# Patient Record
Sex: FEMALE | Race: OTHER | NOT HISPANIC OR LATINO
[De-identification: names, ages, dates, MRNs, and addresses within clinical notes are randomized per-mention and may not be internally consistent; named-entity substitution may affect disease eponyms.]

---

## 2018-02-05 PROBLEM — Z00.00 ENCOUNTER FOR PREVENTIVE HEALTH EXAMINATION: Status: ACTIVE | Noted: 2018-02-05

## 2018-02-13 ENCOUNTER — APPOINTMENT (OUTPATIENT)
Dept: OPHTHALMOLOGY | Facility: CLINIC | Age: 33
End: 2018-02-13

## 2018-04-03 ENCOUNTER — APPOINTMENT (OUTPATIENT)
Dept: OPHTHALMOLOGY | Facility: CLINIC | Age: 33
End: 2018-04-03

## 2018-05-30 ENCOUNTER — APPOINTMENT (OUTPATIENT)
Dept: OPHTHALMOLOGY | Facility: CLINIC | Age: 33
End: 2018-05-30
Payer: MEDICAID

## 2018-05-30 PROCEDURE — 76512 OPH US DX B-SCAN: CPT

## 2018-05-30 PROCEDURE — 92002 INTRM OPH EXAM NEW PATIENT: CPT

## 2018-05-30 RX ORDER — DORZOLAMIDE HYDROCHLORIDE TIMOLOL MALEATE 20; 5 MG/ML; MG/ML
22.3-6.8 SOLUTION/ DROPS OPHTHALMIC
Qty: 1 | Refills: 5 | Status: ACTIVE | COMMUNITY
Start: 2018-05-30 | End: 1900-01-01

## 2018-06-14 ENCOUNTER — APPOINTMENT (OUTPATIENT)
Dept: OPHTHALMOLOGY | Facility: CLINIC | Age: 33
End: 2018-06-14
Payer: MEDICAID

## 2018-06-14 PROCEDURE — 92012 INTRM OPH EXAM EST PATIENT: CPT

## 2018-09-14 ENCOUNTER — APPOINTMENT (OUTPATIENT)
Dept: OPHTHALMOLOGY | Facility: CLINIC | Age: 33
End: 2018-09-14
Payer: MEDICAID

## 2018-09-14 ENCOUNTER — TRANSCRIPTION ENCOUNTER (OUTPATIENT)
Age: 33
End: 2018-09-14

## 2018-09-14 DIAGNOSIS — H17.9 UNSPECIFIED CORNEAL SCAR AND OPACITY: ICD-10-CM

## 2018-09-14 DIAGNOSIS — H40.2233 CHRONIC ANGLE-CLOSURE GLAUCOMA, BILATERAL, SEVERE STAGE: ICD-10-CM

## 2018-09-14 DIAGNOSIS — Q13.1 ABSENCE OF IRIS: ICD-10-CM

## 2018-09-14 DIAGNOSIS — H55.00 UNSPECIFIED NYSTAGMUS: ICD-10-CM

## 2018-09-14 PROCEDURE — 92012 INTRM OPH EXAM EST PATIENT: CPT

## 2018-09-16 ENCOUNTER — INPATIENT (INPATIENT)
Facility: HOSPITAL | Age: 33
LOS: 4 days | Discharge: HOME HEALTH | End: 2018-09-21
Attending: OBSTETRICS & GYNECOLOGY | Admitting: OBSTETRICS & GYNECOLOGY
Payer: COMMERCIAL

## 2018-09-16 ENCOUNTER — RESULT REVIEW (OUTPATIENT)
Age: 33
End: 2018-09-16

## 2018-09-16 VITALS
HEART RATE: 98 BPM | RESPIRATION RATE: 20 BRPM | TEMPERATURE: 98 F | SYSTOLIC BLOOD PRESSURE: 193 MMHG | OXYGEN SATURATION: 99 % | DIASTOLIC BLOOD PRESSURE: 112 MMHG

## 2018-09-16 DIAGNOSIS — N17.9 ACUTE KIDNEY FAILURE, UNSPECIFIED: ICD-10-CM

## 2018-09-16 DIAGNOSIS — O99.89 OTHER SPECIFIED DISEASES AND CONDITIONS COMPLICATING PREGNANCY, CHILDBIRTH AND THE PUERPERIUM: ICD-10-CM

## 2018-09-16 DIAGNOSIS — I27.0 PRIMARY PULMONARY HYPERTENSION: ICD-10-CM

## 2018-09-16 DIAGNOSIS — Z79.4 LONG TERM (CURRENT) USE OF INSULIN: ICD-10-CM

## 2018-09-16 DIAGNOSIS — Z91.14 PATIENT'S OTHER NONCOMPLIANCE WITH MEDICATION REGIMEN: ICD-10-CM

## 2018-09-16 DIAGNOSIS — Z3A.34 34 WEEKS GESTATION OF PREGNANCY: ICD-10-CM

## 2018-09-16 DIAGNOSIS — Q13.1 ABSENCE OF IRIS: ICD-10-CM

## 2018-09-16 DIAGNOSIS — H40.9 UNSPECIFIED GLAUCOMA: ICD-10-CM

## 2018-09-16 DIAGNOSIS — N73.6 FEMALE PELVIC PERITONEAL ADHESIONS (POSTINFECTIVE): ICD-10-CM

## 2018-09-16 DIAGNOSIS — K56.7 ILEUS, UNSPECIFIED: ICD-10-CM

## 2018-09-16 DIAGNOSIS — E66.9 OBESITY, UNSPECIFIED: ICD-10-CM

## 2018-09-16 DIAGNOSIS — H54.8 LEGAL BLINDNESS, AS DEFINED IN USA: ICD-10-CM

## 2018-09-16 DIAGNOSIS — O60.03 PRETERM LABOR WITHOUT DELIVERY, THIRD TRIMESTER: ICD-10-CM

## 2018-09-16 DIAGNOSIS — O30.033 TWIN PREGNANCY, MONOCHORIONIC/DIAMNIOTIC, THIRD TRIMESTER: ICD-10-CM

## 2018-09-16 DIAGNOSIS — E87.70 FLUID OVERLOAD, UNSPECIFIED: ICD-10-CM

## 2018-09-16 LAB
ALBUMIN SERPL ELPH-MCNC: 2.5 G/DL — LOW (ref 3.3–5)
ALBUMIN SERPL ELPH-MCNC: 2.9 G/DL — LOW (ref 3.3–5)
ALP SERPL-CCNC: 81 U/L — SIGNIFICANT CHANGE UP (ref 40–120)
ALP SERPL-CCNC: 87 U/L — SIGNIFICANT CHANGE UP (ref 40–120)
ALT FLD-CCNC: 8 U/L — LOW (ref 10–45)
ALT FLD-CCNC: 8 U/L — LOW (ref 10–45)
ANION GAP SERPL CALC-SCNC: 16 MMOL/L — SIGNIFICANT CHANGE UP (ref 5–17)
ANION GAP SERPL CALC-SCNC: 20 MMOL/L — HIGH (ref 5–17)
APPEARANCE UR: CLEAR — SIGNIFICANT CHANGE UP
APTT BLD: 25.7 SEC — LOW (ref 27.5–37.4)
APTT BLD: 26.1 SEC — LOW (ref 27.5–37.4)
AST SERPL-CCNC: 17 U/L — SIGNIFICANT CHANGE UP (ref 10–40)
AST SERPL-CCNC: 23 U/L — SIGNIFICANT CHANGE UP (ref 10–40)
BASOPHILS NFR BLD AUTO: 0.1 % — SIGNIFICANT CHANGE UP (ref 0–2)
BASOPHILS NFR BLD AUTO: 0.3 % — SIGNIFICANT CHANGE UP (ref 0–2)
BILIRUB SERPL-MCNC: 0.3 MG/DL — SIGNIFICANT CHANGE UP (ref 0.2–1.2)
BILIRUB SERPL-MCNC: 0.4 MG/DL — SIGNIFICANT CHANGE UP (ref 0.2–1.2)
BILIRUB UR-MCNC: NEGATIVE — SIGNIFICANT CHANGE UP
BUN SERPL-MCNC: 15 MG/DL — SIGNIFICANT CHANGE UP (ref 7–23)
BUN SERPL-MCNC: 15 MG/DL — SIGNIFICANT CHANGE UP (ref 7–23)
CALCIUM SERPL-MCNC: 8.7 MG/DL — SIGNIFICANT CHANGE UP (ref 8.4–10.5)
CALCIUM SERPL-MCNC: 9.2 MG/DL — SIGNIFICANT CHANGE UP (ref 8.4–10.5)
CHLORIDE SERPL-SCNC: 99 MMOL/L — SIGNIFICANT CHANGE UP (ref 96–108)
CHLORIDE SERPL-SCNC: 99 MMOL/L — SIGNIFICANT CHANGE UP (ref 96–108)
CO2 SERPL-SCNC: 20 MMOL/L — LOW (ref 22–31)
CO2 SERPL-SCNC: 20 MMOL/L — LOW (ref 22–31)
COLOR SPEC: YELLOW — SIGNIFICANT CHANGE UP
CREAT ?TM UR-MCNC: 74 MG/DL — SIGNIFICANT CHANGE UP
CREAT SERPL-MCNC: 1.03 MG/DL — SIGNIFICANT CHANGE UP (ref 0.5–1.3)
CREAT SERPL-MCNC: 1.04 MG/DL — SIGNIFICANT CHANGE UP (ref 0.5–1.3)
DIFF PNL FLD: ABNORMAL
EOSINOPHIL NFR BLD AUTO: 0.1 % — SIGNIFICANT CHANGE UP (ref 0–6)
EOSINOPHIL NFR BLD AUTO: 0.8 % — SIGNIFICANT CHANGE UP (ref 0–6)
FIBRINOGEN PPP-MCNC: 440 MG/DL — HIGH (ref 258–438)
FIBRINOGEN PPP-MCNC: 484 MG/DL — HIGH (ref 258–438)
GLUCOSE BLDC GLUCOMTR-MCNC: 157 MG/DL — HIGH (ref 70–99)
GLUCOSE SERPL-MCNC: 118 MG/DL — HIGH (ref 70–99)
GLUCOSE SERPL-MCNC: 177 MG/DL — HIGH (ref 70–99)
GLUCOSE UR QL: NEGATIVE — SIGNIFICANT CHANGE UP
HCT VFR BLD CALC: 31.9 % — LOW (ref 34.5–45)
HCT VFR BLD CALC: 34.4 % — LOW (ref 34.5–45)
HGB BLD-MCNC: 10.1 G/DL — LOW (ref 11.5–15.5)
HGB BLD-MCNC: 10.8 G/DL — LOW (ref 11.5–15.5)
INR BLD: 0.89 — SIGNIFICANT CHANGE UP (ref 0.88–1.16)
INR BLD: 0.95 — SIGNIFICANT CHANGE UP (ref 0.88–1.16)
KETONES UR-MCNC: 15 MG/DL
LDH SERPL L TO P-CCNC: 270 U/L — HIGH (ref 50–242)
LDH SERPL L TO P-CCNC: 464 U/L — HIGH (ref 50–242)
LEUKOCYTE ESTERASE UR-ACNC: NEGATIVE — SIGNIFICANT CHANGE UP
LYMPHOCYTES # BLD AUTO: 16.9 % — SIGNIFICANT CHANGE UP (ref 13–44)
LYMPHOCYTES # BLD AUTO: 6.8 % — LOW (ref 13–44)
MAGNESIUM SERPL-MCNC: 5 MG/DL — HIGH (ref 1.6–2.6)
MCHC RBC-ENTMCNC: 27.2 PG — SIGNIFICANT CHANGE UP (ref 27–34)
MCHC RBC-ENTMCNC: 27.5 PG — SIGNIFICANT CHANGE UP (ref 27–34)
MCHC RBC-ENTMCNC: 31.4 G/DL — LOW (ref 32–36)
MCHC RBC-ENTMCNC: 31.7 G/DL — LOW (ref 32–36)
MCV RBC AUTO: 86.6 FL — SIGNIFICANT CHANGE UP (ref 80–100)
MCV RBC AUTO: 86.9 FL — SIGNIFICANT CHANGE UP (ref 80–100)
MONOCYTES NFR BLD AUTO: 10.1 % — SIGNIFICANT CHANGE UP (ref 2–14)
MONOCYTES NFR BLD AUTO: 11.3 % — SIGNIFICANT CHANGE UP (ref 2–14)
NEUTROPHILS NFR BLD AUTO: 71.9 % — SIGNIFICANT CHANGE UP (ref 43–77)
NEUTROPHILS NFR BLD AUTO: 81.7 % — HIGH (ref 43–77)
NITRITE UR-MCNC: NEGATIVE — SIGNIFICANT CHANGE UP
PH UR: 5.5 — SIGNIFICANT CHANGE UP (ref 5–8)
PLATELET # BLD AUTO: 187 K/UL — SIGNIFICANT CHANGE UP (ref 150–400)
PLATELET # BLD AUTO: 212 K/UL — SIGNIFICANT CHANGE UP (ref 150–400)
POTASSIUM SERPL-MCNC: 4.2 MMOL/L — SIGNIFICANT CHANGE UP (ref 3.5–5.3)
POTASSIUM SERPL-MCNC: 4.3 MMOL/L — SIGNIFICANT CHANGE UP (ref 3.5–5.3)
POTASSIUM SERPL-SCNC: 4.2 MMOL/L — SIGNIFICANT CHANGE UP (ref 3.5–5.3)
POTASSIUM SERPL-SCNC: 4.3 MMOL/L — SIGNIFICANT CHANGE UP (ref 3.5–5.3)
PROT ?TM UR-MCNC: 171 MG/DL — HIGH (ref 0–12)
PROT SERPL-MCNC: 5.5 G/DL — LOW (ref 6–8.3)
PROT SERPL-MCNC: 6 G/DL — SIGNIFICANT CHANGE UP (ref 6–8.3)
PROT UR-MCNC: 100 MG/DL
PROT/CREAT UR-RTO: 2.3 RATIO — HIGH (ref 0–0.2)
PROTHROM AB SERPL-ACNC: 10.5 SEC — SIGNIFICANT CHANGE UP (ref 9.8–12.7)
PROTHROM AB SERPL-ACNC: 9.9 SEC — SIGNIFICANT CHANGE UP (ref 9.8–12.7)
RBC # BLD: 3.67 M/UL — LOW (ref 3.8–5.2)
RBC # BLD: 3.97 M/UL — SIGNIFICANT CHANGE UP (ref 3.8–5.2)
RBC # FLD: 13.6 % — SIGNIFICANT CHANGE UP (ref 10.3–16.9)
RBC # FLD: 13.6 % — SIGNIFICANT CHANGE UP (ref 10.3–16.9)
SODIUM SERPL-SCNC: 135 MMOL/L — SIGNIFICANT CHANGE UP (ref 135–145)
SODIUM SERPL-SCNC: 139 MMOL/L — SIGNIFICANT CHANGE UP (ref 135–145)
SP GR SPEC: >=1.03 — SIGNIFICANT CHANGE UP (ref 1–1.03)
URATE SERPL-MCNC: 7.7 MG/DL — HIGH (ref 2.5–7)
URATE SERPL-MCNC: 7.7 MG/DL — HIGH (ref 2.5–7)
UROBILINOGEN FLD QL: 0.2 E.U./DL — SIGNIFICANT CHANGE UP
WBC # BLD: 11.8 K/UL — HIGH (ref 3.8–10.5)
WBC # BLD: 14.1 K/UL — HIGH (ref 3.8–10.5)
WBC # FLD AUTO: 11.8 K/UL — HIGH (ref 3.8–10.5)
WBC # FLD AUTO: 14.1 K/UL — HIGH (ref 3.8–10.5)

## 2018-09-16 RX ORDER — SODIUM CHLORIDE 9 MG/ML
1000 INJECTION, SOLUTION INTRAVENOUS
Qty: 0 | Refills: 0 | Status: DISCONTINUED | OUTPATIENT
Start: 2018-09-16 | End: 2018-09-18

## 2018-09-16 RX ORDER — GLYCERIN ADULT
1 SUPPOSITORY, RECTAL RECTAL AT BEDTIME
Qty: 0 | Refills: 0 | Status: DISCONTINUED | OUTPATIENT
Start: 2018-09-16 | End: 2018-09-17

## 2018-09-16 RX ORDER — IBUPROFEN 200 MG
600 TABLET ORAL ONCE
Qty: 0 | Refills: 0 | Status: COMPLETED | OUTPATIENT
Start: 2018-09-16 | End: 2018-09-16

## 2018-09-16 RX ORDER — CITRIC ACID/SODIUM CITRATE 300-500 MG
30 SOLUTION, ORAL ORAL ONCE
Qty: 0 | Refills: 0 | Status: DISCONTINUED | OUTPATIENT
Start: 2018-09-16 | End: 2018-09-17

## 2018-09-16 RX ORDER — LABETALOL HCL 100 MG
200 TABLET ORAL
Qty: 0 | Refills: 0 | Status: DISCONTINUED | OUTPATIENT
Start: 2018-09-16 | End: 2018-09-17

## 2018-09-16 RX ORDER — MAGNESIUM SULFATE 500 MG/ML
2 VIAL (ML) INJECTION
Qty: 40 | Refills: 0 | Status: DISCONTINUED | OUTPATIENT
Start: 2018-09-16 | End: 2018-09-17

## 2018-09-16 RX ORDER — OXYTOCIN 10 UNIT/ML
333.33 VIAL (ML) INJECTION
Qty: 20 | Refills: 0 | Status: DISCONTINUED | OUTPATIENT
Start: 2018-09-16 | End: 2018-09-16

## 2018-09-16 RX ORDER — PENICILLIN G POTASSIUM 5000000 [IU]/1
2.5 POWDER, FOR SOLUTION INTRAMUSCULAR; INTRAPLEURAL; INTRATHECAL; INTRAVENOUS EVERY 4 HOURS
Qty: 0 | Refills: 0 | Status: DISCONTINUED | OUTPATIENT
Start: 2018-09-16 | End: 2018-09-16

## 2018-09-16 RX ORDER — NALOXONE HYDROCHLORIDE 4 MG/.1ML
0.1 SPRAY NASAL
Qty: 0 | Refills: 0 | Status: DISCONTINUED | OUTPATIENT
Start: 2018-09-16 | End: 2018-09-18

## 2018-09-16 RX ORDER — TETANUS TOXOID, REDUCED DIPHTHERIA TOXOID AND ACELLULAR PERTUSSIS VACCINE, ADSORBED 5; 2.5; 8; 8; 2.5 [IU]/.5ML; [IU]/.5ML; UG/.5ML; UG/.5ML; UG/.5ML
0.5 SUSPENSION INTRAMUSCULAR ONCE
Qty: 0 | Refills: 0 | Status: DISCONTINUED | OUTPATIENT
Start: 2018-09-16 | End: 2018-09-21

## 2018-09-16 RX ORDER — FERROUS SULFATE 325(65) MG
325 TABLET ORAL DAILY
Qty: 0 | Refills: 0 | Status: DISCONTINUED | OUTPATIENT
Start: 2018-09-16 | End: 2018-09-18

## 2018-09-16 RX ORDER — ACETAMINOPHEN 500 MG
1000 TABLET ORAL ONCE
Qty: 0 | Refills: 0 | Status: DISCONTINUED | OUTPATIENT
Start: 2018-09-16 | End: 2018-09-17

## 2018-09-16 RX ORDER — LABETALOL HCL 100 MG
80 TABLET ORAL ONCE
Qty: 0 | Refills: 0 | Status: COMPLETED | OUTPATIENT
Start: 2018-09-16 | End: 2018-09-16

## 2018-09-16 RX ORDER — CITRIC ACID/SODIUM CITRATE 300-500 MG
15 SOLUTION, ORAL ORAL EVERY 4 HOURS
Qty: 0 | Refills: 0 | Status: DISCONTINUED | OUTPATIENT
Start: 2018-09-16 | End: 2018-09-16

## 2018-09-16 RX ORDER — SIMETHICONE 80 MG/1
80 TABLET, CHEWABLE ORAL EVERY 4 HOURS
Qty: 0 | Refills: 0 | Status: DISCONTINUED | OUTPATIENT
Start: 2018-09-16 | End: 2018-09-21

## 2018-09-16 RX ORDER — FUROSEMIDE 40 MG
20 TABLET ORAL ONCE
Qty: 0 | Refills: 0 | Status: COMPLETED | OUTPATIENT
Start: 2018-09-16 | End: 2018-09-16

## 2018-09-16 RX ORDER — HYDROMORPHONE HYDROCHLORIDE 2 MG/ML
0.5 INJECTION INTRAMUSCULAR; INTRAVENOUS; SUBCUTANEOUS
Qty: 0 | Refills: 0 | Status: DISCONTINUED | OUTPATIENT
Start: 2018-09-16 | End: 2018-09-16

## 2018-09-16 RX ORDER — DEXTROSE 50 % IN WATER 50 %
25 SYRINGE (ML) INTRAVENOUS ONCE
Qty: 0 | Refills: 0 | Status: DISCONTINUED | OUTPATIENT
Start: 2018-09-16 | End: 2018-09-17

## 2018-09-16 RX ORDER — HYDRALAZINE HCL 50 MG
10 TABLET ORAL ONCE
Qty: 0 | Refills: 0 | Status: COMPLETED | OUTPATIENT
Start: 2018-09-16 | End: 2018-09-16

## 2018-09-16 RX ORDER — PENICILLIN G POTASSIUM 5000000 [IU]/1
5 POWDER, FOR SOLUTION INTRAMUSCULAR; INTRAPLEURAL; INTRATHECAL; INTRAVENOUS ONCE
Qty: 0 | Refills: 0 | Status: DISCONTINUED | OUTPATIENT
Start: 2018-09-16 | End: 2018-09-16

## 2018-09-16 RX ORDER — OXYCODONE AND ACETAMINOPHEN 5; 325 MG/1; MG/1
2 TABLET ORAL EVERY 6 HOURS
Qty: 0 | Refills: 0 | Status: DISCONTINUED | OUTPATIENT
Start: 2018-09-16 | End: 2018-09-18

## 2018-09-16 RX ORDER — LANOLIN
1 OINTMENT (GRAM) TOPICAL
Qty: 0 | Refills: 0 | Status: DISCONTINUED | OUTPATIENT
Start: 2018-09-16 | End: 2018-09-21

## 2018-09-16 RX ORDER — DEXTROSE 50 % IN WATER 50 %
12.5 SYRINGE (ML) INTRAVENOUS ONCE
Qty: 0 | Refills: 0 | Status: DISCONTINUED | OUTPATIENT
Start: 2018-09-16 | End: 2018-09-17

## 2018-09-16 RX ORDER — HYDROMORPHONE HYDROCHLORIDE 2 MG/ML
30 INJECTION INTRAMUSCULAR; INTRAVENOUS; SUBCUTANEOUS
Qty: 0 | Refills: 0 | Status: DISCONTINUED | OUTPATIENT
Start: 2018-09-16 | End: 2018-09-17

## 2018-09-16 RX ORDER — AZITHROMYCIN 500 MG/1
500 TABLET, FILM COATED ORAL ONCE
Qty: 0 | Refills: 0 | Status: COMPLETED | OUTPATIENT
Start: 2018-09-16 | End: 2018-09-16

## 2018-09-16 RX ORDER — DEXTROSE 50 % IN WATER 50 %
15 SYRINGE (ML) INTRAVENOUS ONCE
Qty: 0 | Refills: 0 | Status: DISCONTINUED | OUTPATIENT
Start: 2018-09-16 | End: 2018-09-17

## 2018-09-16 RX ORDER — SODIUM CHLORIDE 9 MG/ML
1000 INJECTION, SOLUTION INTRAVENOUS ONCE
Qty: 0 | Refills: 0 | Status: DISCONTINUED | OUTPATIENT
Start: 2018-09-16 | End: 2018-09-16

## 2018-09-16 RX ORDER — ONDANSETRON 8 MG/1
4 TABLET, FILM COATED ORAL EVERY 6 HOURS
Qty: 0 | Refills: 0 | Status: DISCONTINUED | OUTPATIENT
Start: 2018-09-16 | End: 2018-09-21

## 2018-09-16 RX ORDER — DOCUSATE SODIUM 100 MG
100 CAPSULE ORAL
Qty: 0 | Refills: 0 | Status: DISCONTINUED | OUTPATIENT
Start: 2018-09-16 | End: 2018-09-18

## 2018-09-16 RX ORDER — GLUCAGON INJECTION, SOLUTION 0.5 MG/.1ML
1 INJECTION, SOLUTION SUBCUTANEOUS ONCE
Qty: 0 | Refills: 0 | Status: DISCONTINUED | OUTPATIENT
Start: 2018-09-16 | End: 2018-09-17

## 2018-09-16 RX ORDER — SODIUM CHLORIDE 9 MG/ML
1000 INJECTION, SOLUTION INTRAVENOUS
Qty: 0 | Refills: 0 | Status: DISCONTINUED | OUTPATIENT
Start: 2018-09-16 | End: 2018-09-17

## 2018-09-16 RX ORDER — INSULIN LISPRO 100/ML
VIAL (ML) SUBCUTANEOUS
Qty: 0 | Refills: 0 | Status: DISCONTINUED | OUTPATIENT
Start: 2018-09-16 | End: 2018-09-21

## 2018-09-16 RX ORDER — SODIUM CHLORIDE 9 MG/ML
1000 INJECTION, SOLUTION INTRAVENOUS
Qty: 0 | Refills: 0 | Status: DISCONTINUED | OUTPATIENT
Start: 2018-09-16 | End: 2018-09-16

## 2018-09-16 RX ORDER — PENICILLIN G POTASSIUM 5000000 [IU]/1
POWDER, FOR SOLUTION INTRAMUSCULAR; INTRAPLEURAL; INTRATHECAL; INTRAVENOUS
Qty: 0 | Refills: 0 | Status: DISCONTINUED | OUTPATIENT
Start: 2018-09-16 | End: 2018-09-16

## 2018-09-16 RX ORDER — DIPHENHYDRAMINE HCL 50 MG
25 CAPSULE ORAL EVERY 6 HOURS
Qty: 0 | Refills: 0 | Status: DISCONTINUED | OUTPATIENT
Start: 2018-09-16 | End: 2018-09-17

## 2018-09-16 RX ORDER — CEFAZOLIN SODIUM 1 G
2000 VIAL (EA) INJECTION ONCE
Qty: 0 | Refills: 0 | Status: DISCONTINUED | OUTPATIENT
Start: 2018-09-16 | End: 2018-09-16

## 2018-09-16 RX ORDER — OXYCODONE AND ACETAMINOPHEN 5; 325 MG/1; MG/1
1 TABLET ORAL
Qty: 0 | Refills: 0 | Status: DISCONTINUED | OUTPATIENT
Start: 2018-09-16 | End: 2018-09-18

## 2018-09-16 RX ORDER — MAGNESIUM SULFATE 500 MG/ML
4 VIAL (ML) INJECTION ONCE
Qty: 0 | Refills: 0 | Status: COMPLETED | OUTPATIENT
Start: 2018-09-16 | End: 2018-09-16

## 2018-09-16 RX ORDER — OXYTOCIN 10 UNIT/ML
41.67 VIAL (ML) INJECTION
Qty: 20 | Refills: 0 | Status: DISCONTINUED | OUTPATIENT
Start: 2018-09-16 | End: 2018-09-18

## 2018-09-16 RX ADMIN — HYDROMORPHONE HYDROCHLORIDE 0.5 MILLIGRAM(S): 2 INJECTION INTRAMUSCULAR; INTRAVENOUS; SUBCUTANEOUS at 14:10

## 2018-09-16 RX ADMIN — Medication 200 MILLIGRAM(S): at 19:27

## 2018-09-16 RX ADMIN — Medication 600 MILLIGRAM(S): at 20:51

## 2018-09-16 RX ADMIN — Medication 125 MILLIUNIT(S)/MIN: at 16:15

## 2018-09-16 RX ADMIN — Medication 80 MILLIGRAM(S): at 21:25

## 2018-09-16 RX ADMIN — OXYCODONE AND ACETAMINOPHEN 1 TABLET(S): 5; 325 TABLET ORAL at 21:06

## 2018-09-16 RX ADMIN — Medication 600 MILLIGRAM(S): at 21:50

## 2018-09-16 RX ADMIN — HYDROMORPHONE HYDROCHLORIDE 0.5 MILLIGRAM(S): 2 INJECTION INTRAMUSCULAR; INTRAVENOUS; SUBCUTANEOUS at 13:53

## 2018-09-16 RX ADMIN — HYDROMORPHONE HYDROCHLORIDE 0.5 MILLIGRAM(S): 2 INJECTION INTRAMUSCULAR; INTRAVENOUS; SUBCUTANEOUS at 15:23

## 2018-09-16 RX ADMIN — Medication 300 GRAM(S): at 10:19

## 2018-09-16 RX ADMIN — Medication 10 MILLIGRAM(S): at 15:15

## 2018-09-16 RX ADMIN — OXYCODONE AND ACETAMINOPHEN 1 TABLET(S): 5; 325 TABLET ORAL at 22:06

## 2018-09-16 RX ADMIN — SODIUM CHLORIDE 125 MILLILITER(S): 9 INJECTION, SOLUTION INTRAVENOUS at 10:23

## 2018-09-16 RX ADMIN — Medication 2: at 19:24

## 2018-09-16 RX ADMIN — Medication 50 GM/HR: at 10:40

## 2018-09-16 RX ADMIN — HYDROMORPHONE HYDROCHLORIDE 30 MILLILITER(S): 2 INJECTION INTRAMUSCULAR; INTRAVENOUS; SUBCUTANEOUS at 23:00

## 2018-09-16 RX ADMIN — Medication 20 MILLIGRAM(S): at 22:02

## 2018-09-16 RX ADMIN — AZITHROMYCIN 255 MILLIGRAM(S): 500 TABLET, FILM COATED ORAL at 11:50

## 2018-09-16 NOTE — PROGRESS NOTE ADULT - ASSESSMENT
A&P: 33y Female   s/p CS @34wks for PTL/pPROM of mono-di twins complicated by preeclampsia with severe features  - Preeclampsia: Continue IV Magnesium @2G/hr for 24 hrs post delivery. Magnesium clinical checks and serum assay q6 hrs. No complaints currently.   - VTE: SCDs, SQH  - GI: Diet: clears as tolerated if not nauseous  - Neuro: Oral pain medications as needed: hold NSAIDs  - : strict Is and Os, D/C estrella after discontinuation of IV Magnesium  - Discharge planning: discussed importance of BP check at home, will be evaluated by PP nursing re need for VNS. Discussed close follow up with OB within 1-2 wks. Reviewed toxic complaints with patient.

## 2018-09-16 NOTE — ED PROVIDER NOTE - OBJECTIVE STATEMENT
The patient has the chief complaint of abdominal pain due to active labor. The patient has no other acute problems. The patient is alert, oriented and in appropriate distress due to the active labor. This evaluation determines that the patient is cleared to be admitted and transferred to labor and delivery for continuation of evaluation and management.

## 2018-09-16 NOTE — PROGRESS NOTE ADULT - SUBJECTIVE AND OBJECTIVE BOX
S: Pt evaluated at bedside for magnesium check. Received Hydralazine 10mg IVP @1515 for severe range BPs in PACU, with appropriate response.  She denies visual disturbances, headache and right upper quadrant pain. Also denies nausea/vomiting/epigastric pain/shortness of breath. Pain well controlled.     O:  T(C): 36.9 (18 @ 09:29), Max: 36.9 (18 @ 09:29)  HR: 98 (18 @ 16:36) (78 - 98)  BP: 143/76 (18 @ 16:36) (128/69 - 193/112)  RR: 12 (18 @ 16:36) (12 - 28)  SpO2: 95% (18 @ 16:36) (94% - 99%)  Wt(kg): --  Daily Height in cm: 154.94 (16 Sep 2018 13:36)    Daily Weight Pre-pregnancy in k (16 Sep 2018 13:36)     @ 07:01  -   @ 17:07  --------------------------------------------------------  IN: 1800 mL / OUT: 1880 mL / NET: -80 mL      Gen: NAD, AAOx3  CV: RRR, no M/R/G  Pulm: CTAB, no R/R/W  Abd: soft, nontender, no rebound or guarding, no epigastric tenderness, liver nonpalpable +BS. Prevena in place.  : Bliss   Ext: +3 edema yeimi                          10.8   11.8  )-----------( 212      ( 16 Sep 2018 10:24 )             34.4         139  |  99  |  15  ----------------------------<  118<H>  4.2   |  20<L>  |  1.03    Ca    9.2      16 Sep 2018 10:24    TPro  6.0  /  Alb  2.9<L>  /  TBili  0.4  /  DBili  x   /  AST  17  /  ALT  8<L>  /  AlkPhos  87  09-16

## 2018-09-16 NOTE — ED PROVIDER NOTE - NOTES
aware bp is elevated.  pt denies hx of this.  will recheck in L and D and start Mag if still elevated.

## 2018-09-17 DIAGNOSIS — O14.90 UNSPECIFIED PRE-ECLAMPSIA, UNSPECIFIED TRIMESTER: ICD-10-CM

## 2018-09-17 DIAGNOSIS — E83.41 HYPERMAGNESEMIA: ICD-10-CM

## 2018-09-17 LAB
ALBUMIN SERPL ELPH-MCNC: 2.1 G/DL — LOW (ref 3.3–5)
ALBUMIN SERPL ELPH-MCNC: 2.4 G/DL — LOW (ref 3.3–5)
ALP SERPL-CCNC: 69 U/L — SIGNIFICANT CHANGE UP (ref 40–120)
ALP SERPL-CCNC: 78 U/L — SIGNIFICANT CHANGE UP (ref 40–120)
ALT FLD-CCNC: 5 U/L — LOW (ref 10–45)
ALT FLD-CCNC: 8 U/L — LOW (ref 10–45)
ANION GAP SERPL CALC-SCNC: 12 MMOL/L — SIGNIFICANT CHANGE UP (ref 5–17)
ANION GAP SERPL CALC-SCNC: 12 MMOL/L — SIGNIFICANT CHANGE UP (ref 5–17)
APTT BLD: 27.5 SEC — SIGNIFICANT CHANGE UP (ref 27.5–37.4)
AST SERPL-CCNC: 18 U/L — SIGNIFICANT CHANGE UP (ref 10–40)
AST SERPL-CCNC: 19 U/L — SIGNIFICANT CHANGE UP (ref 10–40)
BASOPHILS NFR BLD AUTO: 0.1 % — SIGNIFICANT CHANGE UP (ref 0–2)
BASOPHILS NFR BLD AUTO: 0.1 % — SIGNIFICANT CHANGE UP (ref 0–2)
BILIRUB SERPL-MCNC: 0.3 MG/DL — SIGNIFICANT CHANGE UP (ref 0.2–1.2)
BILIRUB SERPL-MCNC: 0.3 MG/DL — SIGNIFICANT CHANGE UP (ref 0.2–1.2)
BUN SERPL-MCNC: 13 MG/DL — SIGNIFICANT CHANGE UP (ref 7–23)
BUN SERPL-MCNC: 14 MG/DL — SIGNIFICANT CHANGE UP (ref 7–23)
CALCIUM SERPL-MCNC: 8.1 MG/DL — LOW (ref 8.4–10.5)
CALCIUM SERPL-MCNC: 8.4 MG/DL — SIGNIFICANT CHANGE UP (ref 8.4–10.5)
CHLORIDE SERPL-SCNC: 100 MMOL/L — SIGNIFICANT CHANGE UP (ref 96–108)
CHLORIDE SERPL-SCNC: 94 MMOL/L — LOW (ref 96–108)
CO2 SERPL-SCNC: 23 MMOL/L — SIGNIFICANT CHANGE UP (ref 22–31)
CO2 SERPL-SCNC: 23 MMOL/L — SIGNIFICANT CHANGE UP (ref 22–31)
CREAT SERPL-MCNC: 1.11 MG/DL — SIGNIFICANT CHANGE UP (ref 0.5–1.3)
CREAT SERPL-MCNC: 1.15 MG/DL — SIGNIFICANT CHANGE UP (ref 0.5–1.3)
EOSINOPHIL NFR BLD AUTO: 0.2 % — SIGNIFICANT CHANGE UP (ref 0–6)
EOSINOPHIL NFR BLD AUTO: 0.2 % — SIGNIFICANT CHANGE UP (ref 0–6)
FIBRINOGEN PPP-MCNC: 490 MG/DL — HIGH (ref 258–438)
GLUCOSE BLDC GLUCOMTR-MCNC: 141 MG/DL — HIGH (ref 70–99)
GLUCOSE BLDC GLUCOMTR-MCNC: 146 MG/DL — HIGH (ref 70–99)
GLUCOSE BLDC GLUCOMTR-MCNC: 171 MG/DL — HIGH (ref 70–99)
GLUCOSE BLDC GLUCOMTR-MCNC: 182 MG/DL — HIGH (ref 70–99)
GLUCOSE BLDC GLUCOMTR-MCNC: 188 MG/DL — HIGH (ref 70–99)
GLUCOSE SERPL-MCNC: 135 MG/DL — HIGH (ref 70–99)
GLUCOSE SERPL-MCNC: 177 MG/DL — HIGH (ref 70–99)
HCT VFR BLD CALC: 27.2 % — LOW (ref 34.5–45)
HCT VFR BLD CALC: 30.5 % — LOW (ref 34.5–45)
HGB BLD-MCNC: 8.7 G/DL — LOW (ref 11.5–15.5)
HGB BLD-MCNC: 9.4 G/DL — LOW (ref 11.5–15.5)
INR BLD: 0.93 — SIGNIFICANT CHANGE UP (ref 0.88–1.16)
LDH SERPL L TO P-CCNC: 460 U/L — HIGH (ref 50–242)
LYMPHOCYTES # BLD AUTO: 7.9 % — LOW (ref 13–44)
LYMPHOCYTES # BLD AUTO: 9.5 % — LOW (ref 13–44)
MAGNESIUM SERPL-MCNC: 6.2 MG/DL — HIGH (ref 1.6–2.6)
MAGNESIUM SERPL-MCNC: 7.3 MG/DL — CRITICAL HIGH (ref 1.6–2.6)
MCHC RBC-ENTMCNC: 26.6 PG — LOW (ref 27–34)
MCHC RBC-ENTMCNC: 27.4 PG — SIGNIFICANT CHANGE UP (ref 27–34)
MCHC RBC-ENTMCNC: 30.8 G/DL — LOW (ref 32–36)
MCHC RBC-ENTMCNC: 32 G/DL — SIGNIFICANT CHANGE UP (ref 32–36)
MCV RBC AUTO: 85.5 FL — SIGNIFICANT CHANGE UP (ref 80–100)
MCV RBC AUTO: 86.4 FL — SIGNIFICANT CHANGE UP (ref 80–100)
MONOCYTES NFR BLD AUTO: 6.5 % — SIGNIFICANT CHANGE UP (ref 2–14)
MONOCYTES NFR BLD AUTO: 9.7 % — SIGNIFICANT CHANGE UP (ref 2–14)
NEUTROPHILS NFR BLD AUTO: 80.5 % — HIGH (ref 43–77)
NEUTROPHILS NFR BLD AUTO: 85.3 % — HIGH (ref 43–77)
PLATELET # BLD AUTO: 191 K/UL — SIGNIFICANT CHANGE UP (ref 150–400)
PLATELET # BLD AUTO: 219 K/UL — SIGNIFICANT CHANGE UP (ref 150–400)
POTASSIUM SERPL-MCNC: 4.3 MMOL/L — SIGNIFICANT CHANGE UP (ref 3.5–5.3)
POTASSIUM SERPL-MCNC: 4.6 MMOL/L — SIGNIFICANT CHANGE UP (ref 3.5–5.3)
POTASSIUM SERPL-SCNC: 4.3 MMOL/L — SIGNIFICANT CHANGE UP (ref 3.5–5.3)
POTASSIUM SERPL-SCNC: 4.6 MMOL/L — SIGNIFICANT CHANGE UP (ref 3.5–5.3)
PROT SERPL-MCNC: 4.9 G/DL — LOW (ref 6–8.3)
PROT SERPL-MCNC: 5.4 G/DL — LOW (ref 6–8.3)
PROTHROM AB SERPL-ACNC: 10.3 SEC — SIGNIFICANT CHANGE UP (ref 9.8–12.7)
RBC # BLD: 3.18 M/UL — LOW (ref 3.8–5.2)
RBC # BLD: 3.53 M/UL — LOW (ref 3.8–5.2)
RBC # FLD: 13.9 % — SIGNIFICANT CHANGE UP (ref 10.3–16.9)
RBC # FLD: 14.2 % — SIGNIFICANT CHANGE UP (ref 10.3–16.9)
SODIUM SERPL-SCNC: 129 MMOL/L — LOW (ref 135–145)
SODIUM SERPL-SCNC: 135 MMOL/L — SIGNIFICANT CHANGE UP (ref 135–145)
T PALLIDUM AB TITR SER: NEGATIVE — SIGNIFICANT CHANGE UP
URATE SERPL-MCNC: 7.8 MG/DL — HIGH (ref 2.5–7)
WBC # BLD: 13.1 K/UL — HIGH (ref 3.8–10.5)
WBC # BLD: 16.4 K/UL — HIGH (ref 3.8–10.5)
WBC # FLD AUTO: 13.1 K/UL — HIGH (ref 3.8–10.5)
WBC # FLD AUTO: 16.4 K/UL — HIGH (ref 3.8–10.5)

## 2018-09-17 PROCEDURE — 74019 RADEX ABDOMEN 2 VIEWS: CPT | Mod: 26

## 2018-09-17 PROCEDURE — 71045 X-RAY EXAM CHEST 1 VIEW: CPT | Mod: 26

## 2018-09-17 RX ORDER — LABETALOL HCL 100 MG
40 TABLET ORAL ONCE
Qty: 0 | Refills: 0 | Status: COMPLETED | OUTPATIENT
Start: 2018-09-17 | End: 2018-09-17

## 2018-09-17 RX ORDER — MAGNESIUM SULFATE 500 MG/ML
1.5 VIAL (ML) INJECTION
Qty: 40 | Refills: 0 | Status: DISCONTINUED | OUTPATIENT
Start: 2018-09-17 | End: 2018-09-17

## 2018-09-17 RX ORDER — LABETALOL HCL 100 MG
20 TABLET ORAL ONCE
Qty: 0 | Refills: 0 | Status: COMPLETED | OUTPATIENT
Start: 2018-09-17 | End: 2018-09-17

## 2018-09-17 RX ORDER — HEPARIN SODIUM 5000 [USP'U]/ML
5000 INJECTION INTRAVENOUS; SUBCUTANEOUS EVERY 12 HOURS
Qty: 0 | Refills: 0 | Status: DISCONTINUED | OUTPATIENT
Start: 2018-09-17 | End: 2018-09-21

## 2018-09-17 RX ORDER — LABETALOL HCL 100 MG
300 TABLET ORAL EVERY 8 HOURS
Qty: 0 | Refills: 0 | Status: DISCONTINUED | OUTPATIENT
Start: 2018-09-18 | End: 2018-09-18

## 2018-09-17 RX ORDER — FUROSEMIDE 40 MG
20 TABLET ORAL ONCE
Qty: 0 | Refills: 0 | Status: COMPLETED | OUTPATIENT
Start: 2018-09-17 | End: 2018-09-17

## 2018-09-17 RX ADMIN — Medication 2: at 17:14

## 2018-09-17 RX ADMIN — Medication 2: at 22:04

## 2018-09-17 RX ADMIN — HEPARIN SODIUM 5000 UNIT(S): 5000 INJECTION INTRAVENOUS; SUBCUTANEOUS at 17:15

## 2018-09-17 RX ADMIN — SIMETHICONE 80 MILLIGRAM(S): 80 TABLET, CHEWABLE ORAL at 11:09

## 2018-09-17 RX ADMIN — Medication 100 MILLIGRAM(S): at 11:09

## 2018-09-17 RX ADMIN — OXYCODONE AND ACETAMINOPHEN 2 TABLET(S): 5; 325 TABLET ORAL at 11:10

## 2018-09-17 RX ADMIN — SIMETHICONE 80 MILLIGRAM(S): 80 TABLET, CHEWABLE ORAL at 17:15

## 2018-09-17 RX ADMIN — OXYCODONE AND ACETAMINOPHEN 2 TABLET(S): 5; 325 TABLET ORAL at 18:15

## 2018-09-17 RX ADMIN — Medication 2: at 02:30

## 2018-09-17 RX ADMIN — Medication 1 TABLET(S): at 11:09

## 2018-09-17 RX ADMIN — Medication 20 MILLIGRAM(S): at 20:11

## 2018-09-17 RX ADMIN — Medication 200 MILLIGRAM(S): at 17:03

## 2018-09-17 RX ADMIN — Medication 200 MILLIGRAM(S): at 07:15

## 2018-09-17 RX ADMIN — Medication 325 MILLIGRAM(S): at 11:09

## 2018-09-17 RX ADMIN — HEPARIN SODIUM 5000 UNIT(S): 5000 INJECTION INTRAVENOUS; SUBCUTANEOUS at 07:15

## 2018-09-17 RX ADMIN — Medication 0: at 07:16

## 2018-09-17 RX ADMIN — Medication 20 MILLIGRAM(S): at 18:30

## 2018-09-17 RX ADMIN — OXYCODONE AND ACETAMINOPHEN 2 TABLET(S): 5; 325 TABLET ORAL at 17:16

## 2018-09-17 RX ADMIN — OXYCODONE AND ACETAMINOPHEN 2 TABLET(S): 5; 325 TABLET ORAL at 12:10

## 2018-09-17 RX ADMIN — OXYCODONE AND ACETAMINOPHEN 2 TABLET(S): 5; 325 TABLET ORAL at 23:45

## 2018-09-17 RX ADMIN — Medication 40 MILLIGRAM(S): at 19:30

## 2018-09-17 NOTE — PROGRESS NOTE ADULT - SUBJECTIVE AND OBJECTIVE BOX
Pt seen at bedside for severe range blood pressure of 176/100. Pt c/o SOB that has been chronic and stable since she has been admitted, no acute exacerbation of SOB, sating at 95% on RA. Also complaining of back pain. Denies HA, CP, palpitations, nausea.    ICU Vital Signs Last 24 Hrs  T(C): 36.7 (17 Sep 2018 19:10), Max: 36.7 (17 Sep 2018 19:10)  T(F): 98 (17 Sep 2018 19:10), Max: 98 (17 Sep 2018 19:10)  HR: 84 (17 Sep 2018 19:10) (68 - 88)  BP: 176/100 (17 Sep 2018 19:10) (103/81 - 176/101)  BP(mean): --  ABP: --  ABP(mean): --  RR: 16 (17 Sep 2018 19:10) (8 - 20)  SpO2: 95% (17 Sep 2018 19:10) (93% - 99%)

## 2018-09-17 NOTE — PROGRESS NOTE ADULT - ASSESSMENT
A&P: 33y Female  s/p CS @34wks for PTL/pPROM of mono-di twins complicated by breech presentation and PEC with SF (BP).  1. Preeclampsia:   Continue IV Magnesium @2G/hr for 24 hrs post delivery.   Magnesium clinical checks and serum assay q6 hrs.  Next Mg check @ 05:30.  No complaints currently.   BP controlled  2. GI: Diet: Clears as tolerated  3. Neuro: PO pain medications PRN, hold NSAIDs  4. : strict Is and Os, estrella in place

## 2018-09-17 NOTE — CONSULT NOTE ADULT - PROBLEM SELECTOR RECOMMENDATION 9
with severe features  Urine p/cr @ 2.3  cr @1.11  s/p magnesium drip  s.p hydralazine and labetalol  latest bp @ 139/91  repeat bmp  check 24 h urine protein  continue labetalol 200 mg po bid  continue monitoring BP with severe features  Urine p/cr @ 2.3  cr @1.11  s/p magnesium drip  s.p hydralazine and labetalol  latest bp @ 139/91  repeat bmp  check 24 h urine protein  continue labetalol 200 mg po bid  continue monitoring BP  maintain BP< 140/90  recommend echo

## 2018-09-17 NOTE — PROGRESS NOTE ADULT - SUBJECTIVE AND OBJECTIVE BOX
Patient evaluated at bedside this morning.  Currently s/p IV mg 2/2 elevated mg level of 7.3. She denies headache, right upper quadrant pain, nausea, vomiting, epigastric pain, shortness of breath. s/p PCA - pain well controlled. Reports positive flatus. Bliss in situ. Has not yet ambulated.      T(C): 36.1 (18 @ 07:00), Max: 36.1 (18 @ 05:00)  HR: 82 (18 @ 07:00) (68 - 82)  BP: 130/80 (18 @ 07:00) (103/81 - 130/80)  RR: 16 (18 @ 07:00) (8 - 20)  SpO2: 96% (18 @ 07:00) (94% - 98%)  Wt(kg): --  Daily Height in cm: 154.94 (16 Sep 2018 13:36)    Daily Weight Pre-pregnancy in k (16 Sep 2018 13:36)     @ 07:01  -   @ 07:00  --------------------------------------------------------  IN: 2550 mL / OUT: 3595 mL / NET: -1045 mL      Gen: NAD, AAOx3  CV: RRR, clear s1 s2  Pulm: CTAB  Abd: soft, diffuse mild tenderness, mildly distended, no rebound or guarding, liver nonpalpable, +BS x 4, fundus unable to be palpated 2/2 keloids.   : Bliss in place  Ext: +2 edema yeimi, SCDs in place, unable to illicit reflexes 2/2 patient position                           8.7    13.1  )-----------( 191      ( 17 Sep 2018 07:16 )             27.2         135  |  100  |  13  ----------------------------<  135<H>  4.3   |  23  |  1.11    Ca    8.1<L>      17 Sep 2018 07:16  Mg     7.3         TPro  4.9<L>  /  Alb  2.1<L>  /  TBili  0.3  /  DBili  x   /  AST  18  /  ALT  5<L>  /  AlkPhos  69      dextrose 5%. 1000 milliLiter(s) IV Continuous <Continuous>  diphtheria/tetanus/pertussis (acellular) Vaccine (ADAcel) 0.5 milliLiter(s) IntraMuscular once  docusate sodium 100 milliGRAM(s) Oral two times a day PRN  ferrous    sulfate 325 milliGRAM(s) Oral daily  heparin  Injectable 5000 Unit(s) SubCutaneous every 12 hours  insulin lispro (HumaLOG) corrective regimen sliding scale   SubCutaneous Before meals and at bedtime  labetalol 200 milliGRAM(s) Oral two times a day  lactated ringers. 1000 milliLiter(s) IV Continuous <Continuous>  lanolin Ointment 1 Application(s) Topical every 3 hours PRN  naloxone Injectable 0.1 milliGRAM(s) IV Push every 3 minutes PRN  ondansetron Injectable 4 milliGRAM(s) IV Push every 6 hours PRN  oxyCODONE    5 mG/acetaminophen 325 mG 1 Tablet(s) Oral every 3 hours PRN  oxyCODONE    5 mG/acetaminophen 325 mG 2 Tablet(s) Oral every 6 hours PRN  oxytocin Infusion 41.667 milliUNIT(s)/Min IV Continuous <Continuous>  prenatal multivitamin 1 Tablet(s) Oral daily  simethicone 80 milliGRAM(s) Chew every 4 hours PRN      18 @ 07:01  -  18 @ 07:00  --------------------------------------------------------  IN: 2550 mL / OUT: 3595 mL / NET: -1045 mL Patient evaluated at bedside this morning.  Currently s/p IV mg 2/2 elevated mg level of 7.3. She denies headache, right upper quadrant pain, nausea, vomiting, epigastric pain, shortness of breath. s/p PCA - pain well controlled. Reports positive flatus. Bliss in situ. Has not yet ambulated.      T(C): 36.1 (18 @ 07:00), Max: 36.1 (18 @ 05:00)  HR: 82 (18 @ 07:00) (68 - 82)  BP: 130/80 (18 @ 07:00) (103/81 - 130/80)  RR: 16 (18 @ 07:00) (8 - 20)  SpO2: 96% (18 @ 07:00) (94% - 98%)  Wt(kg): --  Daily Height in cm: 154.94 (16 Sep 2018 13:36)    Daily Weight Pre-pregnancy in k (16 Sep 2018 13:36)     @ 07:01  -   @ 07:00  --------------------------------------------------------  IN: 2550 mL / OUT: 3595 mL / NET: -1045 mL      Gen: NAD, AAOx3  CV: RRR, clear s1 s2  Pulm: CTAB  Abd: soft, diffuse mild tenderness, mildly distended, no rebound or guarding, liver nonpalpable, +BS x 4, fundus unable to be palpated.   : Bliss in place  Ext: +3 edema yeimi, SCDs in place, unable to illicit reflexes 2/2 patient position                           8.7    13.1  )-----------( 191      ( 17 Sep 2018 07:16 )             27.2         135  |  100  |  13  ----------------------------<  135<H>  4.3   |  23  |  1.11    Ca    8.1<L>      17 Sep 2018 07:16  Mg     7.3         TPro  4.9<L>  /  Alb  2.1<L>  /  TBili  0.3  /  DBili  x   /  AST  18  /  ALT  5<L>  /  AlkPhos  69      dextrose 5%. 1000 milliLiter(s) IV Continuous <Continuous>  diphtheria/tetanus/pertussis (acellular) Vaccine (ADAcel) 0.5 milliLiter(s) IntraMuscular once  docusate sodium 100 milliGRAM(s) Oral two times a day PRN  ferrous    sulfate 325 milliGRAM(s) Oral daily  heparin  Injectable 5000 Unit(s) SubCutaneous every 12 hours  insulin lispro (HumaLOG) corrective regimen sliding scale   SubCutaneous Before meals and at bedtime  labetalol 200 milliGRAM(s) Oral two times a day  lactated ringers. 1000 milliLiter(s) IV Continuous <Continuous>  lanolin Ointment 1 Application(s) Topical every 3 hours PRN  naloxone Injectable 0.1 milliGRAM(s) IV Push every 3 minutes PRN  ondansetron Injectable 4 milliGRAM(s) IV Push every 6 hours PRN  oxyCODONE    5 mG/acetaminophen 325 mG 1 Tablet(s) Oral every 3 hours PRN  oxyCODONE    5 mG/acetaminophen 325 mG 2 Tablet(s) Oral every 6 hours PRN  oxytocin Infusion 41.667 milliUNIT(s)/Min IV Continuous <Continuous>  prenatal multivitamin 1 Tablet(s) Oral daily  simethicone 80 milliGRAM(s) Chew every 4 hours PRN      18 @ 07:01  -  18 @ 07:00  --------------------------------------------------------  IN: 2550 mL / OUT: 3595 mL / NET: -1045 mL

## 2018-09-17 NOTE — CONSULT NOTE ADULT - ASSESSMENT
Patient is a 33 y o female ( g7, p5, A 3) with pmh of preeclampsia, gestational Dm who underwent C section on 9/16/18.  Nephrology consult has been placed in regards to preeclampsia.

## 2018-09-17 NOTE — PROGRESS NOTE ADULT - SUBJECTIVE AND OBJECTIVE BOX
Patient evaluated at bedside for clinical magnesium check.     She denies visual disturbances including scotoma, headache and right upper quadrant pain. Also denies nausea/vomiting/epigastric pain/shortness of breath. Pain well controlled.     HR: 70 (09-16-18 @ 23:03) (70 - 98)  BP: 128/73 (09-16-18 @ 23:03) (115/62 - 174/89)  RR: 12 (09-16-18 @ 23:03) (8 - 31)  SpO2: 96% (09-16-18 @ 23:03) (93% - 96%)    Gen: NAD  Pulm: CTAB  Abd: soft, nontender, no rebound or guarding, no epigastric tenderness, liver nonpalpable +BS, fundus palpated   : Bliss in place  Ext: Reflexes +                          10.1   14.1  )-----------( 187      ( 16 Sep 2018 18:04 )             31.9     09-16    135  |  99  |  15  ----------------------------<  177<H>  4.3   |  20<L>  |  1.04    Ca    8.7      16 Sep 2018 18:04  Mg     6.2     09-17    TPro  5.5<L>  /  Alb  2.5<L>  /  TBili  0.3  /  DBili  x   /  AST  23  /  ALT  8<L>  /  AlkPhos  81  09-16 09-16-18 @ 07:01  -  09-17-18 @ 02:03  --------------------------------------------------------  IN: 1800 mL / OUT: 2805 mL / NET: -1005 mL

## 2018-09-17 NOTE — PROGRESS NOTE ADULT - ASSESSMENT
A&P:   33y Female w/ h/o aniridia/glaucoma s/p CS @34wks for PTL/pPROM of mono-di twins complicated by breech presentation and PEC with severe features (BP).    1. Preeclampsia:   s/p IV mg - stopped early 2/2 elevated serum mg of 7.3.  Antihypertensives: Labetalol 200 BID. Currently normotensive.   Continue to monitor blood pressures.  Creatinine uptrending - (0.75)>1.03>1.1 , continue to monitor u/o and repeat creatinine in PM.    2. GI: mildly distended - continue clears and reevaluate at lunch time    3. : strict Is and Os, reevaluate in 2 hours for removal of estrella     4. Pain: s/p PCA. Tylenol for mild pain. Percocet for severe pain.

## 2018-09-17 NOTE — CONSULT NOTE ADULT - SUBJECTIVE AND OBJECTIVE BOX
Patient is a 33 y o female ( g7, p5, A 3) with pmh of preeclampsia, gestational Dm who underwent C section on 9/16/18.  Nephrology consult has been placed in regards to preeclampsia.  on admission, pt 's bp was > 193/112 with urine P/cr @ 2.3.  Patient reports prior h/o preeclampsia with 1st pregnancy ( 12 years ago).   She also reports being told sometime after 20 weeks of gestation that her Bp was high but was not prescribed any medication. She was never told during this pregnancy that she had proteinuria   She states she regularly followed up  with ob/gyn.  She has never been taking any anti htn medication.   She has never had any miscarriages.   She denies n/v, but does reports diffuse abd pain. she denies any headaches  She denies smoking, alcohol, recreational drugs.   She is s/p magnesium and off anti htn medication.  Urine p/cr @ 2.3  cr on admission @ 1.03 compared to 1.11 on 9/17        PAST MEDICAL & SURGICAL HISTORY:  preeclampsia  gestational DM      Allergies    Allergy Status Unknown    Intolerances        FAMILY HISTORY: HTN      SOCIAL HISTORY: denies smoking, alcohol, recreational drugs    Home meds: ASA, folic acid, iron    MEDICATIONS  (STANDING):  dextrose 5%. 1000 milliLiter(s) (50 mL/Hr) IV Continuous <Continuous>  diphtheria/tetanus/pertussis (acellular) Vaccine (ADAcel) 0.5 milliLiter(s) IntraMuscular once  ferrous    sulfate 325 milliGRAM(s) Oral daily  heparin  Injectable 5000 Unit(s) SubCutaneous every 12 hours  insulin lispro (HumaLOG) corrective regimen sliding scale   SubCutaneous Before meals and at bedtime  labetalol 200 milliGRAM(s) Oral two times a day  oxytocin Infusion 41.667 milliUNIT(s)/Min (125 mL/Hr) IV Continuous <Continuous>  prenatal multivitamin 1 Tablet(s) Oral daily    MEDICATIONS  (PRN):  docusate sodium 100 milliGRAM(s) Oral two times a day PRN Stool Softening  lanolin Ointment 1 Application(s) Topical every 3 hours PRN Sore Nipples  naloxone Injectable 0.1 milliGRAM(s) IV Push every 3 minutes PRN For ANY of the following changes in patient status:  A. RR LESS THAN 10 breaths per minute, B. Oxygen saturation LESS THAN 90%, C. Sedation score of 6  ondansetron Injectable 4 milliGRAM(s) IV Push every 6 hours PRN Nausea  oxyCODONE    5 mG/acetaminophen 325 mG 1 Tablet(s) Oral every 3 hours PRN Moderate Pain (4 - 6)  oxyCODONE    5 mG/acetaminophen 325 mG 2 Tablet(s) Oral every 6 hours PRN Severe Pain (7 - 10)  simethicone 80 milliGRAM(s) Chew every 4 hours PRN Gas      REVIEW OF SYSTEMS:    CONSTITUTIONAL: No fever or chills, No fatigue or tiredness.  EYES: No blurred or double vision.  ENT: No recent URI or sore throat  RESPIRATORY: No shortness of breath, cough, hemoptysis  CARDIOVASCULAR: No Chest pain or shortness of breath  GASTROINTESTINAL: + abdominal pain   GENITOURINARY: No dysuria or urinary burning, No difficulty passing urine, No hematuria  NEUROLOGICAL: No headaches or blurred vision  SKIN: No skin rashes   MUSCULOSKELETAL: No arthralgia, Joint pain, leg edema, No muscle pains        PHYSICAL EXAM:  GENERAL: NAD,   HEAD:  Atraumatic, Normocephalic,   EYES: BLT lens opacification  Oral cavity: Oral mucosa dry and pink  NECK: Neck supple, No JVD  CHEST/LUNG: Clear to auscultation bilaterally; No wheeze, no rales, no crepitations  HEART: Regular rate and rhythm. PRATIK II/VI at LPSB, No gallop, no rub   ABDOMEN: Soft, Nontender, BS+nt, No flank tenderness.   EXTREMITIES: BLT LE pitting edema +2 blt   Neurology: AAOx3, no focal neurological deficit  SKIN: No rashes or lesions  : + estrella cath     CAPILLARY BLOOD GLUCOSE      POCT Blood Glucose.: 146 mg/dL (17 Sep 2018 10:48)  POCT Blood Glucose.: 141 mg/dL (17 Sep 2018 07:01)  POCT Blood Glucose.: 171 mg/dL (17 Sep 2018 02:07)  POCT Blood Glucose.: 157 mg/dL (16 Sep 2018 19:06)      I&O's Summary    16 Sep 2018 07:01  -  17 Sep 2018 07:00  --------------------------------------------------------  IN: 2550 mL / OUT: 3595 mL / NET: -1045 mL    17 Sep 2018 07:01  -  17 Sep 2018 14:41  --------------------------------------------------------  IN: 0 mL / OUT: 355 mL / NET: -355 mL          LABS:                                                   09-17-18 @ 07:16    135  |  100  |  13  ----------------------------<  135<H>  4.3   |  23  |  1.11                                                 09-16-18 @ 18:04    135  |  99  |  15  ----------------------------<  177<H>  4.3   |  20<L>  |  1.04                                                 09-16-18 @ 10:24    139  |  99  |  15  ----------------------------<  118<H>  4.2   |  20<L>  |  1.03    Ca    8.1<L>      17 Sep 2018 07:16  Ca    8.7      16 Sep 2018 18:04  Ca    9.2      16 Sep 2018 10:24  Mg     7.3     09-17  Mg     6.2     09-17  Mg     5.0     09-16    TPro  4.9<L>  /  Alb  2.1<L>  /  TBili  0.3  /  DBili  x   /  AST  18  /  ALT  5<L>  /  AlkPhos  69  09-17  TPro  5.5<L>  /  Alb  2.5<L>  /  TBili  0.3  /  DBili  x   /  AST  23  /  ALT  8<L>  /  AlkPhos  81  09-16  TPro  6.0  /  Alb  2.9<L>  /  TBili  0.4  /  DBili  x   /  AST  17  /  ALT  8<L>  /  AlkPhos  87  09-16                          8.7    13.1  )-----------( 191      ( 17 Sep 2018 07:16 )             27.2     CBC Full  -  ( 17 Sep 2018 07:16 )  WBC Count : 13.1 K/uL  Hemoglobin : 8.7 g/dL  Hematocrit : 27.2 %  Platelet Count - Automated : 191 K/uL  Mean Cell Volume : 85.5 fL      CBC Full  -  ( 16 Sep 2018 18:04 )  WBC Count : 14.1 K/uL  Hemoglobin : 10.1 g/dL  Hematocrit : 31.9 %  Platelet Count - Automated : 187 K/uL  Mean Cell Volume : 86.9 fL      CBC Full  -  ( 16 Sep 2018 10:24 )  WBC Count : 11.8 K/uL  Hemoglobin : 10.8 g/dL  Hematocrit : 34.4 %  Platelet Count - Automated : 212 K/uL  Mean Cell Volume : 86.6 fL        PT/INR - ( 16 Sep 2018 18:04 )   PT: 9.9 sec;   INR: 0.89          PTT - ( 16 Sep 2018 18:04 )  PTT:26.1 sec      Urinalysis Basic - ( 16 Sep 2018 18:04 )    Color: Yellow / Appearance: Clear / SG: >=1.030 / pH: x  Gluc: x / Ketone: 15 mg/dL  / Bili: Negative / Urobili: 0.2 E.U./dL   Blood: x / Protein: 100 mg/dL / Nitrite: NEGATIVE   Leuk Esterase: NEGATIVE / RBC: > 10 /HPF / WBC < 5 /HPF   Sq Epi: x / Non Sq Epi: 0-5 /HPF / Bacteria: Present /HPF        RADIOLOGY & ADDITIONAL TESTS:    EKG/Telemetry: Reviewed

## 2018-09-18 ENCOUNTER — TRANSCRIPTION ENCOUNTER (OUTPATIENT)
Age: 33
End: 2018-09-18

## 2018-09-18 DIAGNOSIS — I27.20 PULMONARY HYPERTENSION, UNSPECIFIED: ICD-10-CM

## 2018-09-18 LAB
ALBUMIN SERPL ELPH-MCNC: 2.4 G/DL — LOW (ref 3.3–5)
ALP SERPL-CCNC: 79 U/L — SIGNIFICANT CHANGE UP (ref 40–120)
ALT FLD-CCNC: 7 U/L — LOW (ref 10–45)
ANION GAP SERPL CALC-SCNC: 10 MMOL/L — SIGNIFICANT CHANGE UP (ref 5–17)
APTT BLD: 27.6 SEC — SIGNIFICANT CHANGE UP (ref 27.5–37.4)
AST SERPL-CCNC: 18 U/L — SIGNIFICANT CHANGE UP (ref 10–40)
BASOPHILS NFR BLD AUTO: 0.1 % — SIGNIFICANT CHANGE UP (ref 0–2)
BILIRUB SERPL-MCNC: 0.3 MG/DL — SIGNIFICANT CHANGE UP (ref 0.2–1.2)
BUN SERPL-MCNC: 13 MG/DL — SIGNIFICANT CHANGE UP (ref 7–23)
CALCIUM SERPL-MCNC: 8.1 MG/DL — LOW (ref 8.4–10.5)
CHLORIDE SERPL-SCNC: 96 MMOL/L — SIGNIFICANT CHANGE UP (ref 96–108)
CO2 SERPL-SCNC: 27 MMOL/L — SIGNIFICANT CHANGE UP (ref 22–31)
CREAT SERPL-MCNC: 1.15 MG/DL — SIGNIFICANT CHANGE UP (ref 0.5–1.3)
EOSINOPHIL NFR BLD AUTO: 0.1 % — SIGNIFICANT CHANGE UP (ref 0–6)
FIBRINOGEN PPP-MCNC: 600 MG/DL — HIGH (ref 258–438)
GLUCOSE BLDC GLUCOMTR-MCNC: 115 MG/DL — HIGH (ref 70–99)
GLUCOSE BLDC GLUCOMTR-MCNC: 125 MG/DL — HIGH (ref 70–99)
GLUCOSE BLDC GLUCOMTR-MCNC: 134 MG/DL — HIGH (ref 70–99)
GLUCOSE BLDC GLUCOMTR-MCNC: 182 MG/DL — HIGH (ref 70–99)
GLUCOSE SERPL-MCNC: 157 MG/DL — HIGH (ref 70–99)
HCT VFR BLD CALC: 29.7 % — LOW (ref 34.5–45)
HGB BLD-MCNC: 9.3 G/DL — LOW (ref 11.5–15.5)
INR BLD: 0.97 — SIGNIFICANT CHANGE UP (ref 0.88–1.16)
LDH SERPL L TO P-CCNC: 445 U/L — HIGH (ref 50–242)
LYMPHOCYTES # BLD AUTO: 7.8 % — LOW (ref 13–44)
MCHC RBC-ENTMCNC: 27.1 PG — SIGNIFICANT CHANGE UP (ref 27–34)
MCHC RBC-ENTMCNC: 31.3 G/DL — LOW (ref 32–36)
MCV RBC AUTO: 86.6 FL — SIGNIFICANT CHANGE UP (ref 80–100)
MONOCYTES NFR BLD AUTO: 7.9 % — SIGNIFICANT CHANGE UP (ref 2–14)
NEUTROPHILS NFR BLD AUTO: 84.1 % — HIGH (ref 43–77)
PLATELET # BLD AUTO: 237 K/UL — SIGNIFICANT CHANGE UP (ref 150–400)
POTASSIUM SERPL-MCNC: 4.4 MMOL/L — SIGNIFICANT CHANGE UP (ref 3.5–5.3)
POTASSIUM SERPL-SCNC: 4.4 MMOL/L — SIGNIFICANT CHANGE UP (ref 3.5–5.3)
PROT SERPL-MCNC: 5.6 G/DL — LOW (ref 6–8.3)
PROTHROM AB SERPL-ACNC: 10.8 SEC — SIGNIFICANT CHANGE UP (ref 9.8–12.7)
RBC # BLD: 3.43 M/UL — LOW (ref 3.8–5.2)
RBC # FLD: 14.3 % — SIGNIFICANT CHANGE UP (ref 10.3–16.9)
SODIUM SERPL-SCNC: 133 MMOL/L — LOW (ref 135–145)
SURGICAL PATHOLOGY STUDY: SIGNIFICANT CHANGE UP
URATE SERPL-MCNC: 8.2 MG/DL — HIGH (ref 2.5–7)
WBC # BLD: 15.6 K/UL — HIGH (ref 3.8–10.5)
WBC # FLD AUTO: 15.6 K/UL — HIGH (ref 3.8–10.5)

## 2018-09-18 PROCEDURE — 99222 1ST HOSP IP/OBS MODERATE 55: CPT

## 2018-09-18 PROCEDURE — 93306 TTE W/DOPPLER COMPLETE: CPT | Mod: 26

## 2018-09-18 RX ORDER — ACETAMINOPHEN 500 MG
1000 TABLET ORAL ONCE
Qty: 0 | Refills: 0 | Status: COMPLETED | OUTPATIENT
Start: 2018-09-18 | End: 2018-09-18

## 2018-09-18 RX ORDER — ACETAMINOPHEN 500 MG
1000 TABLET ORAL ONCE
Qty: 0 | Refills: 0 | Status: DISCONTINUED | OUTPATIENT
Start: 2018-09-18 | End: 2018-09-18

## 2018-09-18 RX ORDER — FUROSEMIDE 40 MG
20 TABLET ORAL EVERY 12 HOURS
Qty: 0 | Refills: 0 | Status: DISCONTINUED | OUTPATIENT
Start: 2018-09-18 | End: 2018-09-19

## 2018-09-18 RX ORDER — LABETALOL HCL 100 MG
40 TABLET ORAL EVERY 6 HOURS
Qty: 0 | Refills: 0 | Status: DISCONTINUED | OUTPATIENT
Start: 2018-09-18 | End: 2018-09-18

## 2018-09-18 RX ORDER — SODIUM CHLORIDE 9 MG/ML
1000 INJECTION INTRAMUSCULAR; INTRAVENOUS; SUBCUTANEOUS
Qty: 0 | Refills: 0 | Status: DISCONTINUED | OUTPATIENT
Start: 2018-09-18 | End: 2018-09-18

## 2018-09-18 RX ORDER — LABETALOL HCL 100 MG
40 TABLET ORAL ONCE
Qty: 0 | Refills: 0 | Status: COMPLETED | OUTPATIENT
Start: 2018-09-18 | End: 2018-09-18

## 2018-09-18 RX ORDER — LABETALOL HCL 100 MG
40 TABLET ORAL EVERY 4 HOURS
Qty: 0 | Refills: 0 | Status: DISCONTINUED | OUTPATIENT
Start: 2018-09-18 | End: 2018-09-19

## 2018-09-18 RX ORDER — LABETALOL HCL 100 MG
40 TABLET ORAL ONCE
Qty: 0 | Refills: 0 | Status: DISCONTINUED | OUTPATIENT
Start: 2018-09-18 | End: 2018-09-18

## 2018-09-18 RX ORDER — FUROSEMIDE 40 MG
40 TABLET ORAL EVERY 12 HOURS
Qty: 0 | Refills: 0 | Status: DISCONTINUED | OUTPATIENT
Start: 2018-09-18 | End: 2018-09-18

## 2018-09-18 RX ORDER — LABETALOL HCL 100 MG
20 TABLET ORAL ONCE
Qty: 0 | Refills: 0 | Status: COMPLETED | OUTPATIENT
Start: 2018-09-18 | End: 2018-09-18

## 2018-09-18 RX ORDER — INFLUENZA VIRUS VACCINE 15; 15; 15; 15 UG/.5ML; UG/.5ML; UG/.5ML; UG/.5ML
0.5 SUSPENSION INTRAMUSCULAR ONCE
Qty: 0 | Refills: 0 | Status: COMPLETED | OUTPATIENT
Start: 2018-09-18 | End: 2018-09-18

## 2018-09-18 RX ORDER — SODIUM CHLORIDE 9 MG/ML
1000 INJECTION INTRAMUSCULAR; INTRAVENOUS; SUBCUTANEOUS
Qty: 0 | Refills: 0 | Status: DISCONTINUED | OUTPATIENT
Start: 2018-09-18 | End: 2018-09-19

## 2018-09-18 RX ADMIN — Medication 20 MILLIGRAM(S): at 19:15

## 2018-09-18 RX ADMIN — Medication 1000 MILLIGRAM(S): at 04:51

## 2018-09-18 RX ADMIN — Medication 20 MILLIGRAM(S): at 08:55

## 2018-09-18 RX ADMIN — Medication 40 MILLIGRAM(S): at 21:00

## 2018-09-18 RX ADMIN — Medication 2: at 06:35

## 2018-09-18 RX ADMIN — Medication 400 MILLIGRAM(S): at 04:05

## 2018-09-18 RX ADMIN — Medication 20 MILLIGRAM(S): at 09:32

## 2018-09-18 RX ADMIN — SODIUM CHLORIDE 125 MILLILITER(S): 9 INJECTION INTRAMUSCULAR; INTRAVENOUS; SUBCUTANEOUS at 03:05

## 2018-09-18 RX ADMIN — Medication 1000 MILLIGRAM(S): at 23:00

## 2018-09-18 RX ADMIN — Medication 1000 MILLIGRAM(S): at 14:30

## 2018-09-18 RX ADMIN — Medication 40 MILLIGRAM(S): at 17:25

## 2018-09-18 RX ADMIN — Medication 400 MILLIGRAM(S): at 22:13

## 2018-09-18 RX ADMIN — Medication 40 MILLIGRAM(S): at 13:23

## 2018-09-18 RX ADMIN — OXYCODONE AND ACETAMINOPHEN 2 TABLET(S): 5; 325 TABLET ORAL at 00:45

## 2018-09-18 RX ADMIN — Medication 400 MILLIGRAM(S): at 13:34

## 2018-09-18 RX ADMIN — HEPARIN SODIUM 5000 UNIT(S): 5000 INJECTION INTRAVENOUS; SUBCUTANEOUS at 06:15

## 2018-09-18 RX ADMIN — HEPARIN SODIUM 5000 UNIT(S): 5000 INJECTION INTRAVENOUS; SUBCUTANEOUS at 18:40

## 2018-09-18 NOTE — PROGRESS NOTE ADULT - SUBJECTIVE AND OBJECTIVE BOX
BEDSIDE EVALUATION BY PGY3    Patient evaluated at bedside. 20 mg IV labetalol pushed at 8:55 AM for BP 170s/100s and another 20 mg IV labetalol at 9:30 AM for , nephrology recommending 40 mg IV labetalol q6h standing while patient is unable to tolerate oral medications. Patient reports pain is improved from yesterday and that it is now mainly lower abdominal by her incision, cramping. She is passing flatus but has not had a bowel movement since shortly after delivery. She has not aleksandra nauseated since she vomited overnight, she is currently NPO and on NS@50 cc(turned down from 125 cc/hr per nephrology).    She has the estrella in place for urine studies per nephrology. She ambulated this morning with assistance.   She denies headache, dizziness, chest pain, palpitations, shortness of breath.     Vital Signs Last 24 Hrs  T(C): 36.7 (18 Sep 2018 08:45), Max: 37.6 (17 Sep 2018 23:17)  T(F): 98 (18 Sep 2018 08:45), Max: 99.7 (18 Sep 2018 04:57)  HR: 78 (18 Sep 2018 09:25) (67 - 100)  BP: 155/98 (18 Sep 2018 09:55) (113/63 - 176/101)  BP(mean): --  RR: 16 (18 Sep 2018 09:25) (16 - 24)  SpO2: 93% (18 Sep 2018 09:25) (92% - 99%)    GA: NAD, A+0 x 3  Abd: moderately distended however improved from yesterday, mildly tender, tympanic, skin below umbilicus thickened, tough, and raised, fundus at umbilicus and midline  : lochia WNL  Estrella: borderline UOP  Extremities: 2+ swelling bilaterally to mid thigh; TEDs                          9.3    15.6  )-----------( 237      ( 18 Sep 2018 07:48 )             29.7   09-18    133<L>  |  96  |  13  ----------------------------<  157<H>  4.4   |  27  |  1.15    Ca    8.1<L>      18 Sep 2018 07:48  Mg     7.3     09-17    TPro  5.6<L>  /  Alb  2.4<L>  /  TBili  0.3  /  DBili  x   /  AST  18  /  ALT  7<L>  /  AlkPhos  79  09-18    PT/INR - ( 18 Sep 2018 07:48 )   PT: 10.8 sec;   INR: 0.97          PTT - ( 18 Sep 2018 07:48 )  PTT:27.6 sec

## 2018-09-18 NOTE — PROGRESS NOTE ADULT - ASSESSMENT
POD2 C/S for breech of the second twin, sPEC s/p IV Mg, followed by nephrology, postop course also c/b distention  - distention: npo, abdominal exam improved from my assessment yesterday, softer and less distended. Patient has not vomited since overnight. Will keep NPO and consider trial of clears later this afternoon if still no vomiting. If patient's distension worsens or she continues to have episodes of vomiting, will consider placing NG tube.   - sPEC: s/p 40 mg IV labetalol this morning, holding PO meds while NPO, per nephrology will start labetalol 40 mg IV q6h standing while NPO. Labs stable w/ creatinine 1.15. Continue 24 hr urine collection.   - Pulmonary edema and pulmonary htn on CXR: patient's SOB improved, satting 92-99%, s/p lasix x2 yesterday. f/u echo today to evaluate pulmonary htn  - diabetes: ISS, consider endocrinology consult if continues to require corrective insulin  - routine pp care

## 2018-09-18 NOTE — CONSULT NOTE ADULT - ATTENDING COMMENTS
34 yo  obese F with congenital aniridia, gestational DM, initially admitted for active labor. Labor was  complicated by pre-eclampsia. She is  s/p  on .   We are consulted for pulmonary hypertension. Her ECHO shows:  Normal left ventricular size and wall thickness.  The left ventricular ejection fraction is estimated to   be 60-65% Right atrial size is normal. There is severe tricuspid regurgitation.The pulmonary artery systolic pressure is  estimated to be 58 mmHg. Structurally normal pulmonic valve. There is mild   pulmonic regurgitation. There is no pericardial effusion.    I concur withy the physical exam. Her JVD is elevated. She has 2(+) pitting edema. Her MP score is 4. She has very faint scattered inspiratory crackles B/L.  P2 is loud, not split. No murmurs. She is legally blind.    CXR reviewed: B/L pulmonary vascular congestion. Azygos vein is prominent. Aortopulmonary window is bulging.     Assessment:  The patient had no symptoms of dyspnea before pregnancy. No PE in herself or anyone in family. No H/O rheumatological conditions diagnosed in her or in the family.   I suspect she is significantly volume overloaded based on her clinical exam and CXR. The elevated PASP is most likely reflective of that.   Suggest:  --diuresis  --Repeat CXR in AM  --After pt is diuresed will recommend repeating ECHO  --If PASP is elevated in an euvolemic state, will start a full work up  --Walk test should be done prior to discharge to assess if pt would need O2 on discharge (recommend doing it prior to discharge after she has been diuresed)
34yo obese F G7, T3, P2, A3 L5 with congenital aniridia, s/p C section yesterday for Severe PEC, breach and premature rupture of membrane at 34weeks of twin gestation. s/p mg drip, BP better controlled, goal <150/100, cont labetalol 200mg BID. Check PEC labs (CMP, CBC, UA, LDH) at 6pm today, MINDY with rising Cr, non oliguric, 50-150cc/hr UO. Pt with volume overload that she states is similar to the last few months of pregnancy. Of note she also had hypoalbuminuria in ED pre op yesterday, denies malnutrition/alcoholism. Denies h/o prior protein in urine during pregnancy however pt got late care, poor follow up and very poor historian. Gestational DM dx at 4months gestation and she was told of elevated bp at this time as well, however no bp meds were necessary and pt states she didn't hear her bp mentioned at subsequent visits, never checked bp at home. no prior h/o HTN, renal disease, DMII. First pregnancy c/b term PEC requiring urgent delivery, different father than current spouse. No fam. h/o PEC in her or father that they know of, no h/o clotting or bleeding d/o. pr:cr 2.6g, check 24hr urine protein, creatinine and creatinine clearance so we can better dulce the degree of her proteinuria and renal function. Would be helpful to get records from OB of any u/a's done during pregnancy and a pre-pregnancy creatinine. Per records she didn't follow up for a 24hr urine protein collection during pregnancy. Baseline Cr in pregnancy was 0.75 which is high end of normal for pregnancy, now 1.1 representing and MINDY likely 2/2 PEC, she may have an underlying diagnosed renal disease. Currently her only complaint is abdominal pain, diffuse, worse than usual, no associated n/v, passing gas and some loose stools, no fevers. Pain is similar to her chronic abdominal pain, says she's seen two physicians pre pregnancy for this pain, told it was either an ulcer or a hiatal hernia however she never got further w/u done, no scope. No prior h/o non gestational dm. Low threshold if pain worsens, Cr worsens or UO drops to get stat CT ab/pelvis.  Volume overloaded likely iatrogenic as well as 2/2 PEC, however also hypoalbuminemic, f/u 24hr urine protein collection for quantification, non nephrotic range on spot today. CXR shows pulm. edema however lung exam clear, speaking in full sentences, no orthopnea, on RA and non oliguric. If develops SOB can give 20mg IV lasix, otherwise suspect she will continue to mobilize the fluids on her own. check echocardiogram given cardiomegally and poss. pulm HTN noted from CXR, given lack of sx and chronicity of LE edema this is likely a chronic issue.

## 2018-09-18 NOTE — PROGRESS NOTE ADULT - SUBJECTIVE AND OBJECTIVE BOX
[facility] DIVISION OF KIDNEY DISEASES AND HYPERTENSION -- FOLLOW UP NOTE  --------------------------------------------------------------------------------  32yo obese F with unclear PMH, poor historian,  3 elective abortions, h/o PEC in first pregnancy w different father, twin gestation c/b lifestyle controlled GHTN and GDM now req. emergent Csection at 34wks for severe PEC/breach:        PAST HISTORY  --------------------------------------------------------------------------------  No significant changes to PMH, PSH, FHx, SHx, unless otherwise noted    ALLERGIES & MEDICATIONS  --------------------------------------------------------------------------------  Allergies    Allergy Status Unknown    Intolerances      Standing Inpatient Medications  diphtheria/tetanus/pertussis (acellular) Vaccine (ADAcel) 0.5 milliLiter(s) IntraMuscular once  heparin  Injectable 5000 Unit(s) SubCutaneous every 12 hours  insulin lispro (HumaLOG) corrective regimen sliding scale   SubCutaneous Before meals and at bedtime  sodium chloride 0.9%. 1000 milliLiter(s) IV Continuous <Continuous>    PRN Inpatient Medications  lanolin Ointment 1 Application(s) Topical every 3 hours PRN  ondansetron Injectable 4 milliGRAM(s) IV Push every 6 hours PRN  simethicone 80 milliGRAM(s) Chew every 4 hours PRN      REVIEW OF SYSTEMS  --------------------------------------------------------------------------------  Gen: No weight changes, fatigue, fevers/chills, weakness  Skin: No rashes  Head/Eyes/Ears/Mouth: No headache; Normal hearing; Normal vision w/o blurriness; No sinus pain/discomfort, sore throat  Respiratory: No dyspnea, cough, wheezing, hemoptysis  CV: No chest pain, PND, orthopnea  GI: No abdominal pain, diarrhea, constipation, nausea, vomiting, melena, hematochezia  : No increased frequency, dysuria, hematuria, nocturia  MSK: No joint pain/swelling; no back pain; no edema  Neuro: No dizziness/lightheadedness, weakness, seizures, numbness, tingling  Heme: No easy bruising or bleeding  Endo: No heat/cold intolerance  Psych: No significant nervousness, anxiety, stress, depression    All other systems were reviewed and are negative, except as noted.    VITALS/PHYSICAL EXAM  --------------------------------------------------------------------------------  T(C): 36.8 (18 @ 13:15), Max: 37.6 (18 @ 23:17)  HR: 75 (18 @ 13:15) (67 - 100)  BP: 147/97 (18 @ 14:04) (119/78 - 176/101)  RR: 18 (18 @ 13:15) (16 - 24)  SpO2: 98% (18 @ 13:15) (92% - 98%)  Wt(kg): --    Weight (kg): 102.965 (18 @ 02:43)      18 @ 07:01  -  18 @ 07:00  --------------------------------------------------------  IN: 0 mL / OUT: 4680 mL / NET: -4680 mL    18 @ 07:01  -  18 @ 14:47  --------------------------------------------------------  IN: 0 mL / OUT: 410 mL / NET: -410 mL      Physical Exam:  	Gen: NAD, well-appearing  	HEENT: PERRL, supple neck, clear oropharynx  	Pulm: CTA B/L  	CV: RRR, S1S2; no rub  	Back: No spinal or CVA tenderness; no sacral edema  	Abd: +BS, soft, nontender/nondistended  	: No suprapubic tenderness  	UE: Warm, FROM, no clubbing, intact strength; no edema; no asterixis  	LE: Warm, FROM, no clubbing, intact strength; no edema  	Neuro: No focal deficits, intact gait  	Psych: Normal affect and mood  	Skin: Warm, without rashes  	Vascular access:    LABS/STUDIES  --------------------------------------------------------------------------------              9.3    15.6  >-----------<  237      [18 @ 07:48]              29.7     133  |  96  |  13  ----------------------------<  157      [18 @ 07:48]  4.4   |  27  |  1.15        Ca     8.1     [18 @ 07:48]      Mg     7.3     [18 @ 07:16]    TPro  5.6  /  Alb  2.4  /  TBili  0.3  /  DBili  x   /  AST  18  /  ALT  7   /  AlkPhos  79  [18 07:48]    PT/INR: PT 10.8 , INR 0.97       [18 07:48]  PTT: 27.6       [18 07:48]    Uric acid 8.2      [18 07:48]        [18 07:48]    Creatinine Trend:  SCr 1.15 [:48]  SCr 1.15 [ 19:10]  SCr 1.11 [ 07:16]  SCr 1.04 [ 18:04]  SCr 1.03 [ 10:24]    Urinalysis - [18 18:04]      Color Yellow / Appearance Clear / SG >=1.030 / pH 5.5      Gluc NEGATIVE / Ketone 15  / Bili Negative / Urobili 0.2       Blood Large / Protein 100 / Leuk Est NEGATIVE / Nitrite NEGATIVE      RBC > 10 / WBC < 5 / Hyaline Few / Gran Occasional / Sq Epi  / Non Sq Epi 0-5 / Bacteria Present    Urine Creatinine 74      [18 @ 18:13]  Urine Protein 171      [18 18:13]        Syphilis Screen (Treponema Pallidum Ab) Negative      [18 @ 22:46] [facility] DIVISION OF KIDNEY DISEASES AND HYPERTENSION -- FOLLOW UP NOTE  --------------------------------------------------------------------------------  32yo obese F with unclear PMH, poor historian,  3 elective abortions, h/o PEC in first pregnancy w different father, twin gestation c/b lifestyle controlled GHTN and GDM now req. emergent Csection at 34wks for severe PEC/breach:  Feeling much less abdominal pain today, no more n/v. NPO. No dizziness/headache or scotoma. LE swelling seems a bit better to her. Still w estrella. Passing gas. No orthopnea/HANSEN. All other ROS negative.    PAST HISTORY  --------------------------------------------------------------------------------  No significant changes to PMH, PSH, FHx, SHx, unless otherwise noted    ALLERGIES & MEDICATIONS  --------------------------------------------------------------------------------  Allergies    NKDA    Standing Inpatient Medications  diphtheria/tetanus/pertussis (acellular) Vaccine (ADAcel) 0.5 milliLiter(s) IntraMuscular once  heparin  Injectable 5000 Unit(s) SubCutaneous every 12 hours  insulin lispro (HumaLOG) corrective regimen sliding scale   SubCutaneous Before meals and at bedtime  sodium chloride 0.9%. 1000 milliLiter(s) IV Continuous <Continuous>    PRN Inpatient Medications  lanolin Ointment 1 Application(s) Topical every 3 hours PRN  ondansetron Injectable 4 milliGRAM(s) IV Push every 6 hours PRN  simethicone 80 milliGRAM(s) Chew every 4 hours PRN      REVIEW OF SYSTEMS  --------------------------------------------------------------------------------  Gen: no fatigue, fevers/chills, weakness  Skin: No rashes  Head/Eyes/Ears/Mouth: No headache; Normal hearing; clinically blind; No sinus pain/discomfort, sore throat  Respiratory: No dyspnea, cough, wheezing, hemoptysis  CV: No chest pain, PND, orthopnea  GI: mild diffuse abdominal pain, no diarrhea, constipation, nausea, vomiting, melena, hematochezia  : estrella  MSK: No joint pain/swelling; no back pain; + edema  Neuro: No dizziness/lightheadedness, weakness, seizures, numbness, tingling  Heme: No easy bruising or bleeding  Endo: No heat/cold intolerance  Psych: No significant nervousness, anxiety, stress, depression    All other systems were reviewed and are negative, except as noted.    VITALS/PHYSICAL EXAM  --------------------------------------------------------------------------------  T(C): 36.8 (18 @ 13:15), Max: 37.6 (18 @ 23:17)  HR: 75 (18 @ 13:15) (67 - 100)  BP: 147/97 (18 @ 14:04) (119/78 - 176/101)  RR: 18 (18 @ 13:15) (16 - 24)  SpO2: 98% (18 @ 13:15) (92% - 98%)  Wt(kg): --    Weight (kg): 102.965 (18 @ 02:43)      18 @ 07:01  -  18 @ 07:00  --------------------------------------------------------  IN: 0 mL / OUT: 4680 mL / NET: -4680 mL    18 @ 07:01  -  18 @ 14:47  --------------------------------------------------------  IN: 0 mL / OUT: 410 mL / NET: -410 mL      Physical Exam:  	Gen: NAD, well-appearing  	HEENT: aniridia  	Pulm: CTA B/L  	CV: RRR, S1S2; no rub  	Abd: +BS, soft, nontender, mildly distended  	: estrella with dilute urine  	UE: Warm, FROM, no clubbing, intact strength; no edema; no asterixis  	LE: Warm, FROM, no clubbing, intact strength; 2+ edema up to shins  	Neuro: No focal deficits, AAOx3  	Psych: Normal affect and mood  	Skin: Warm, without rashes      LABS/STUDIES  --------------------------------------------------------------------------------              9.3    15.6  >-----------<  237      [18 @ 07:48]              29.7     133  |  96  |  13  ----------------------------<  157      [18 @ 07:48]  4.4   |  27  |  1.15        Ca     8.1     [18 @ 07:48]      Mg     7.3     [18 07:16]    TPro  5.6  /  Alb  2.4  /  TBili  0.3  /  DBili  x   /  AST  18  /  ALT  7   /  AlkPhos  79  [18 07:48]    PT/INR: PT 10.8 , INR 0.97       [18 07:48]  PTT: 27.6       [18 07:48]    Uric acid 8.2      [18 07:48]        [18 07:48]    Creatinine Trend:  SCr 1.15 [:48]  SCr 1.15 [ 19:10]  SCr 1.11 [:16]  SCr 1.04 [ 18:04]  SCr 1.03 [ 10:24]    Urinalysis - [18 18:04]      Color Yellow / Appearance Clear / SG >=1.030 / pH 5.5      Gluc NEGATIVE / Ketone 15  / Bili Negative / Urobili 0.2       Blood Large / Protein 100 / Leuk Est NEGATIVE / Nitrite NEGATIVE      RBC > 10 / WBC < 5 / Hyaline Few / Gran Occasional / Sq Epi  / Non Sq Epi 0-5 / Bacteria Present    Urine Creatinine 74      [18 @ 18:13]  Urine Protein 171      [18 18:13]        Syphilis Screen (Treponema Pallidum Ab) Negative      [18 @ 22:46]

## 2018-09-18 NOTE — PROGRESS NOTE ADULT - ASSESSMENT
POD2 C/S for breech of the second twin, sPEC s/p IV Mg, followed by nephrology, postop course also c/b distention  - distention: npo, consider NGT, consider gen surg consult, encourage ambulation   - sPEC: monitor BPs, give IV pushes as needed, f/u nephrology recs, ?labs tomorrow  - diabetes: ISS, consider endocrinology consult  - routine pp care

## 2018-09-18 NOTE — CONSULT NOTE ADULT - PROBLEM SELECTOR RECOMMENDATION 9
Although her lung exam is benign, her extensive pitting LE edema, orthopnea, and CXR are suggestive of fluid overload. It is likely that this is a major contributor to her pulmonary pressures as there is no obvious clues to favor cardiac, primary pulmonary, connective tissue disease, thromboembolic, or other causes. However, we cannot completely rule these out at this point. Would diurese her more to take the fluid off to relieve her orthopnea and HANSEN. Once her fluid status is optimized, can repeat an echocardiogram for further evaluation.    Recommend  - continued diuresis with lasix   - PA and Lateral chest X ray tomorrow to see progression  - LE dopplers and echocardiogram once fluid status optimized  - Serum titers to evaluate for CTD - ELIECER, cardiolipin Ab, CH50, rheumatoid factor, anti-CCP, B-2 microglobulin  - Will need to measure her oxygen saturation with ambulation once fluid status optimized.

## 2018-09-18 NOTE — PROGRESS NOTE ADULT - SUBJECTIVE AND OBJECTIVE BOX
Pt seen at bedside for severe range BP in the 170s. Pt denies SOB, CP, HA, LE edema, changes in vision, RUQ/epigastric pain. Feels well.     ICU Vital Signs Last 24 Hrs  T(C): 36.1 (18 Sep 2018 12:00), Max: 37.6 (17 Sep 2018 23:17)  T(F): 97 (18 Sep 2018 12:00), Max: 99.7 (18 Sep 2018 04:57)  HR: 71 (18 Sep 2018 12:00) (67 - 100)  BP: 150/93 (119/78 - 176/101)  BP(mean): --  ABP: --  ABP(mean): --  RR: 18 (18 Sep 2018 12:00) (16 - 24)  SpO2: 97% (18 Sep 2018 12:00) (92% - 97%)

## 2018-09-18 NOTE — CONSULT NOTE ADULT - ASSESSMENT
32 yo [id number] obese F with congenital aniridia, gestational DM, initially admitted for active labor complicated by pre-eclampsia s/p  on . We are consulted for pulmonary hypertension

## 2018-09-18 NOTE — PROGRESS NOTE ADULT - SUBJECTIVE AND OBJECTIVE BOX
BEDSIDE EVALUATION BY PGY3    Patient evaluated at bedside with nurse management, nephrology and pulm fellow also at bedside. Patient states she feels the same as she did this morning which is better than yesterday. States that her SOB resolved after a dose of IV lasix last night.  Also denies chest pain, palpitations, dizziness, headache. Urine output adequate, BP consistently elevated throughout the day, I pushed 40 mg IV labetalol at 5:30 PM. Per nephrology, patient to get 40 mg IV labetalol q4h for a max of 300 mg/24 hrs. Patient reports pain is improved from yesterday and that it is now mainly lower abdominal by her incision, cramping. She is passing flatus but has not had a bowel movement since shortly after delivery. She has not aleksandra nauseated since she vomited overnight, she is currently NPO and on NS@50 cc(turned down from 125 cc/hr per nephrology).    She has the estrella in place for urine studies per nephrology. She ambulated this morning with assistance.   She denies headache, dizziness, chest pain, palpitations, shortness of breath.     Vital Signs Last 24 Hrs  T(C): 36.7 (18 Sep 2018 08:45), Max: 37.6 (17 Sep 2018 23:17)  T(F): 98 (18 Sep 2018 08:45), Max: 99.7 (18 Sep 2018 04:57)  HR: 78 (18 Sep 2018 09:25) (67 - 100)  BP: 155/98 (18 Sep 2018 09:55) (113/63 - 176/101)  BP(mean): --  RR: 16 (18 Sep 2018 09:25) (16 - 24)  SpO2: 93% (18 Sep 2018 09:25) (92% - 99%)    GA: NAD, A+0 x 3  Abd: moderately distended however improved from yesterday, mildly tender, tympanic, skin below umbilicus thickened, tough, and raised, fundus at umbilicus and midline  : lochia WNL  Estrella: borderline UOP  Extremities: 2+ swelling bilaterally to mid thigh; TEDs                          9.3    15.6  )-----------( 237      ( 18 Sep 2018 07:48 )             29.7   09-18    133<L>  |  96  |  13  ----------------------------<  157<H>  4.4   |  27  |  1.15    Ca    8.1<L>      18 Sep 2018 07:48  Mg     7.3     09-17    TPro  5.6<L>  /  Alb  2.4<L>  /  TBili  0.3  /  DBili  x   /  AST  18  /  ALT  7<L>  /  AlkPhos  79  09-18    PT/INR - ( 18 Sep 2018 07:48 )   PT: 10.8 sec;   INR: 0.97          PTT - ( 18 Sep 2018 07:48 )  PTT:27.6 sec BEDSIDE EVALUATION BY PGY3    Patient evaluated at bedside with nurse management, nephrology and pulm fellow also at bedside. Patient states she feels the same as she did this morning which is better than yesterday. States that her SOB resolved after a dose of IV lasix last night.  Also denies chest pain, palpitations, dizziness, headache. Urine output adequate, BP consistently elevated throughout the day, I pushed 40 mg IV labetalol at 5:30 PM. Per nephrology, patient to get 40 mg IV labetalol q4h for a max of 300 mg/24 hrs. Patient reports pain is controlled w/ IV Tylenol. She is passing flatus, has ambulated within her room. She denies nausea since last night. She is currently NPO and on NS@50 cc.  She has the estrella in place for urine studies per nephrology.     Vital Signs Last 24 Hrs  T(C): 36.7 (18 Sep 2018 17:25), Max: 37.6 (17 Sep 2018 23:17)  T(F): 98 (18 Sep 2018 17:25), Max: 99.7 (18 Sep 2018 04:57)  HR: 71 (18 Sep 2018 17:25) (67 - 100)  BP: 157/85 (18 Sep 2018 17:45) (119/78 - 176/100)  BP(mean): --  RR: 18 (18 Sep 2018 17:25) (16 - 24)  SpO2: 98% (18 Sep 2018 17:25) (92% - 99%)    GA: NAD, A+0 x 3  CV: RRR, no M/R/G  Lungs CTAB decreased breath sounds R>L in lower lung fields, no crackles, wheezes, rhonchi  Abd: moderately distended however improved from yesterday, mildly tender, tympanic, skin below umbilicus thickened, tough, and raised, fundus at umbilicus and midline  : lochia WNL  Estrella: clear urine, adequate  Extremities: 2+ swelling bilaterally to mid thigh; Kaelyn  I&O's Summary    17 Sep 2018 07:01  -  18 Sep 2018 07:00  --------------------------------------------------------  IN: 0 mL / OUT: 4680 mL / NET: -4680 mL    18 Sep 2018 07:01  -  18 Sep 2018 18:37  --------------------------------------------------------  IN: 0 mL / OUT: 935 mL / NET: -935 mL                              9.3    15.6  )-----------( 237      ( 18 Sep 2018 07:48 )             29.7   09-18    133<L>  |  96  |  13  ----------------------------<  157<H>  4.4   |  27  |  1.15    Ca    8.1<L>      18 Sep 2018 07:48  Mg     7.3     09-17    TPro  5.6<L>  /  Alb  2.4<L>  /  TBili  0.3  /  DBili  x   /  AST  18  /  ALT  7<L>  /  AlkPhos  79  09-18    PT/INR - ( 18 Sep 2018 07:48 )   PT: 10.8 sec;   INR: 0.97          PTT - ( 18 Sep 2018 07:48 )  PTT:27.6 sec

## 2018-09-18 NOTE — PROGRESS NOTE ADULT - ASSESSMENT
34yo obese F with unclear PMH, poor historian,  3 elective abortions, h/o PEC in first pregnancy w different father, twin gestation c/b lifestyle controlled GHTN and GDM now req. emergent Csection at 34wks for severe PEC/breach:    # Severe PEC  - repeat labs again in am, uric acid rising however all other pec labs normalized. Suspect given elev. bicarb represents volume contraction with lasix given overnight and ileus impeding PO intake. Agree w IVF 50/hr, monitoring FS while NPO as she has gest. DMII. Can switch to D51/2NS if needed.   - MINDY, Cr stable 1.15 today, f/u tmrw's value. Getting 24hr urine protein/creatinine and creatinine clearance for tmrw morning to further assess MINDY as well as non nephrotic proteinuria, edema and hypoalbuminemia. Suspect underlying renal disease. When more stable, suggest renal sono  - severe range bps off PO meds, would give standing Labetalol 40mg q4hrs with holding parameters not to give if bp <140/90 or HR <60. Can give IV Hydralazine in between if needed for severe range bps. Total max dose lV labetalol is 300mg. If tolerating PO would put her back on 300mg TID Labetalol with holding parameters not to give if bp <110/80 and 30mg Nifedipine daily.   - optimize pain control to help manage bp as well, and d/c IVF once tolerating PO 34yo obese F with unclear PMH, poor historian,  3 elective abortions, h/o PEC in first pregnancy w different father, twin gestation c/b lifestyle controlled GHTN and GDM now req. emergent Csection at 34wks for severe PEC/breach:    # Severe PEC  - repeat labs again in am, uric acid rising however all other pec labs normalized. Suspect given elev. bicarb represents volume contraction with lasix given overnight and ileus impeding PO intake. Agree w IVF 50/hr, monitoring FS while NPO as she has gest. DMII. Can switch to D51/2NS if needed.   - MINDY, Cr stable 1.15 today, f/u tmrw's value. Getting 24hr urine protein/creatinine and creatinine clearance for tmrw morning to further assess MINDY as well as non nephrotic proteinuria, edema and hypoalbuminemia. Suspect underlying renal disease. When more stable, suggest renal sono  - severe range bps off PO meds, would give standing Labetalol 40mg q4hrs with holding parameters not to give if bp <140/90 or HR <60. Can give 10mg IV Hydralazine in between if needed for severe range bps. Total max dose lV labetalol is 300mg. If tolerating PO would put her back on 300mg TID Labetalol with holding parameters not to give if bp <110/80 and 30mg Nifedipine daily.   - optimize pain control to help manage bp as well, and d/c IVF once tolerating PO

## 2018-09-18 NOTE — CONSULT NOTE ADULT - SUBJECTIVE AND OBJECTIVE BOX
HPI:  32 yo [id number] obese F with congenital aniridia, gestational DM, initially admitted for active labor complicated by pre-eclampsia s/p  on . We are consulted for evidence of pulmonary arterial hypertension seen on a recent echocardiogram that was done as part of an evaluation for an abnormal CXR on  concerning for pulmonary edema. Patient thus far has been treated with pre-eclampsia with mag drip (currently off) and labetalol pushes. She was also given 20mg of lasix yesterday in an attempt to relieve her shortness of breath that she had experienced yesterday.  She was seen and examined at bedside. She states that she still has difficulty breathing, and also has trouble taking in deep breaths. The latter is is mostly  due to the lower abdominal pain around the  wound per her. However, she is able to ambulate around the room without difficulty, has no dyspnea at rest, and is not having any other respiratory symptoms.      VITAL SIGNS:  ICU Vital Signs Last 24 Hrs  T(C): 36.7 (18 Sep 2018 17:25), Max: 37.6 (17 Sep 2018 23:17)  T(F): 98 (18 Sep 2018 17:25), Max: 99.7 (18 Sep 2018 04:57)  HR: 71 (18 Sep 2018 17:25) (67 - 100)  BP: 157/85 (18 Sep 2018 17:45) (119/78 - 176/100)  BP(mean): --  ABP: --  ABP(mean): --  RR: 18 (18 Sep 2018 17:25) (16 - 24)  SpO2: 98% (18 Sep 2018 17:25) (92% - 99%)         @ 07:  -   @ 07:00  --------------------------------------------------------  IN: 0 mL / OUT: 4680 mL / NET: -4680 mL     @ 07: @ 18:49  --------------------------------------------------------  IN: 0 mL / OUT: 935 mL / NET: -935 mL      CAPILLARY BLOOD GLUCOSE      POCT Blood Glucose.: 115 mg/dL (18 Sep 2018 16:12)      PHYSICAL EXAM:  Constitutional: resting comfortably in chair, NAD  HEENT: bilaterally corneal opacification with the right eye fixed in a upward gaze; anicteric sclera; no oropharyngeal erythema or exudates; MMM; Mallampati 4  Neck: supple, no JVD, mild hepatojuglar reflux response   Respiratory: CTA B/L, no W/R/R; respirations appear non-labored, speaking full sentences  Cardiovascular: +S1/S2, RRR  Gastrointestinal: abdomen soft, tender to palpation in the lower quadrants and distended; +BS x4  Extremities: WWP; no clubbing, extensive pitting edema of the lower extremities bilaterally up to the level of the knees.  Vascular: 2+ radial, DP/PT  Skin: no obvious rashes on body and skin    MEDICATIONS:  MEDICATIONS  (STANDING):  diphtheria/tetanus/pertussis (acellular) Vaccine (ADAcel) 0.5 milliLiter(s) IntraMuscular once  furosemide   Injectable 40 milliGRAM(s) IV Push every 12 hours  heparin  Injectable 5000 Unit(s) SubCutaneous every 12 hours  influenza   Vaccine 0.5 milliLiter(s) IntraMuscular once  insulin lispro (HumaLOG) corrective regimen sliding scale   SubCutaneous Before meals and at bedtime  labetalol Injectable 40 milliGRAM(s) IV Push every 4 hours  sodium chloride 0.9%. 1000 milliLiter(s) (50 mL/Hr) IV Continuous <Continuous>    MEDICATIONS  (PRN):  lanolin Ointment 1 Application(s) Topical every 3 hours PRN Sore Nipples  ondansetron Injectable 4 milliGRAM(s) IV Push every 6 hours PRN Nausea  simethicone 80 milliGRAM(s) Chew every 4 hours PRN Gas    FHx- father with unknown liver disease. No other pertinent     Social - none significant    ALLERGIES:  Allergies    Allergy Status Unknown    Intolerances        LABS:                        9.3    15.6  )-----------( 237      ( 18 Sep 2018 07:48 )             29.7     09-18    133<L>  |  96  |  13  ----------------------------<  157<H>  4.4   |  27  |  1.15    Ca    8.1<L>      18 Sep 2018 07:48  Mg     7.3         TPro  5.6<L>  /  Alb  2.4<L>  /  TBili  0.3  /  DBili  x   /  AST  18  /  ALT  7<L>  /  AlkPhos  79      PT/INR - ( 18 Sep 2018 07:48 )   PT: 10.8 sec;   INR: 0.97          PTT - ( 18 Sep 2018 07:48 )  PTT:27.6 sec      RADIOLOGY & ADDITIONAL TESTS:   CXR from  reviewed - bilateral pulmonary congestion although with poor inspiratory effort; enlarged cardiac shadow with a blunting between the aortopulmonary space; fullness of the azygous vein can also be seen     < from: Echocardiogram (18 @ 13:10) >  Normal left ventricular size and wall thickness.The left ventricular wall   motion is normal.The left ventricular ejection fraction is estimated to   be   60-65%The left atrial size is normal.Right atrial size is normal.The   right   ventricle is normal in size and function.Structurally normal aortic   valve.No   aortic regurgitation noted.Structurally normal mitral valve.There is mild   mitral regurgitation.Structurally normal tricuspid valve.There is   moderate to   severe tricuspid regurgitation.The pulmonary artery systolic pressure is   estimated to be 58 mmHg.Structurally normal pulmonic valve.There is mild   pulmonic regurgitation.There is no pericardial effusion.    < end of copied text >

## 2018-09-18 NOTE — PROGRESS NOTE ADULT - SUBJECTIVE AND OBJECTIVE BOX
BEDSIDE EVALUATION AT 3AM BY CHIEF RESIDENT    Patient evaluated at bedside.   She reports she still has some pain since vomiting the percocet. She was made NPO overnight and NGT placement was considered but the patient's nausea improved so it was not placed. Her oral medications were stopped due to the NPO status. BP was monitored and no pushes required. The patient reports she has not gotten out of bed at all yet. She has the estrella in place for urine studies per nephrology. She states she has been passing flatus.  She denies headache, dizziness, chest pain, palpitations, shortness of breath.     Physical Exam:  Vital Signs Last 24 Hrs  T(C): 37.6 (18 Sep 2018 04:57), Max: 37.6 (17 Sep 2018 23:17)  T(F): 99.7 (18 Sep 2018 04:57), Max: 99.7 (18 Sep 2018 04:57)  HR: 93 (18 Sep 2018 04:57) (67 - 100)  BP: 137/84 (18 Sep 2018 04:57) (113/63 - 176/101)  RR: 19 (18 Sep 2018 04:57) (16 - 24)  SpO2: 96% (18 Sep 2018 04:57) (92% - 99%)    GA: NAD, A+0 x 3  Abd: distended, mildly tender, tympanic  : lochia WNL  Estrella: adequate UOP  Extremities: swelling bilaterally; SCDs                            9.4    16.4  )-----------( 219      ( 17 Sep 2018 19:10 )             30.5     09-17    129<L>  |  94<L>  |  14  ----------------------------<  177<H>  4.6   |  23  |  1.15    Ca    8.4      17 Sep 2018 19:10  Mg     7.3     09-17    TPro  5.4<L>  /  Alb  2.4<L>  /  TBili  0.3  /  DBili  x   /  AST  19  /  ALT  8<L>  /  AlkPhos  78  09-17      PT/INR - ( 17 Sep 2018 19:10 )   PT: 10.3 sec;   INR: 0.93          PTT - ( 17 Sep 2018 19:10 )  PTT:27.5 sec

## 2018-09-18 NOTE — PROGRESS NOTE ADULT - ASSESSMENT
POD2 C/S for breech of the second twin, sPEC s/p IV Mg, followed by nephrology, postop course also c/b distention  - distention: npo, abdominal exam improved from my assessment yesterday, softer and less distended. Patient has not vomited since overnight. Will keep NPO and consider trial of clears later this afternoon if still no vomiting. If patient's distension worsens or she continues to have episodes of vomiting, will consider placing NG tube.   - sPEC: s/p 40 mg IV labetalol this morning, holding PO meds while NPO, per nephrology will start labetalol 40 mg IV q6h standing while NPO. Labs stable w/ creatinine 1.15. Continue 24 hr urine collection.   - Pulmonary edema and pulmonary htn on CXR: patient's SOB improved, satting 92-99%, s/p lasix x2 yesterday. f/u echo today to evaluate pulmonary htn  - diabetes: ISS, consider endocrinology consult if continues to require corrective insulin  - routine pp care POD2 C/S for breech of the second twin, preeclampsia w/ severe features (BPs) s/p IV Mg, followed by nephrology, postop course also c/b ileus.  - distention: npo, abdominal exam improved from my assessment yesterday, softer and less distended. Patient has not vomited since overnight. Will keep NPO and consider trial of clears later this afternoon if still no vomiting. If patient's distension worsens or she continues to have episodes of vomiting, will consider placing NG tube.   - sPEC: per nephrology while NPO: IV labetalol 40 mg q4h if >140/90 and HR>60, not to exceed 300 mg/24hrs, hydralazine IVP in between labetalol pushes. Labs stable w/ creatinine 1.15. Continue 24 hr urine collection.   - Severe pulmonary htn--also pulmonary edema. s/p pulm consult, appreciate recs. Will start lasix 20 mg IVP BID for diuresis, suspect 2/2 fluid overload, will also r/o thrombus and autoimmune as cause of pulm htn.   - diabetes: ISS, consider endocrinology consult if continues to require corrective insulin  - routine pp care

## 2018-09-19 LAB
ALBUMIN SERPL ELPH-MCNC: 2.4 G/DL — LOW (ref 3.3–5)
ALP SERPL-CCNC: 78 U/L — SIGNIFICANT CHANGE UP (ref 40–120)
ALT FLD-CCNC: 8 U/L — LOW (ref 10–45)
ANION GAP SERPL CALC-SCNC: 11 MMOL/L — SIGNIFICANT CHANGE UP (ref 5–17)
AST SERPL-CCNC: 15 U/L — SIGNIFICANT CHANGE UP (ref 10–40)
BILIRUB SERPL-MCNC: 0.4 MG/DL — SIGNIFICANT CHANGE UP (ref 0.2–1.2)
BODY SURFACE AREA CALCULATION: 1.73 M2 — SIGNIFICANT CHANGE UP
BUN SERPL-MCNC: 12 MG/DL — SIGNIFICANT CHANGE UP (ref 7–23)
CALCIUM SERPL-MCNC: 8.5 MG/DL — SIGNIFICANT CHANGE UP (ref 8.4–10.5)
CHLORIDE SERPL-SCNC: 103 MMOL/L — SIGNIFICANT CHANGE UP (ref 96–108)
CO2 SERPL-SCNC: 26 MMOL/L — SIGNIFICANT CHANGE UP (ref 22–31)
COLLECT DURATION TIME UR: 24 HR — SIGNIFICANT CHANGE UP
CREAT SERPL-MCNC: 0.85 MG/DL — SIGNIFICANT CHANGE UP (ref 0.5–1.3)
GLUCOSE BLDC GLUCOMTR-MCNC: 107 MG/DL — HIGH (ref 70–99)
GLUCOSE BLDC GLUCOMTR-MCNC: 138 MG/DL — HIGH (ref 70–99)
GLUCOSE SERPL-MCNC: 113 MG/DL — HIGH (ref 70–99)
HCT VFR BLD CALC: 32 % — LOW (ref 34.5–45)
HGB BLD-MCNC: 9.8 G/DL — LOW (ref 11.5–15.5)
LDH SERPL L TO P-CCNC: 355 U/L — HIGH (ref 50–242)
MCHC RBC-ENTMCNC: 27.2 PG — SIGNIFICANT CHANGE UP (ref 27–34)
MCHC RBC-ENTMCNC: 30.6 G/DL — LOW (ref 32–36)
MCV RBC AUTO: 88.9 FL — SIGNIFICANT CHANGE UP (ref 80–100)
PLATELET # BLD AUTO: 296 K/UL — SIGNIFICANT CHANGE UP (ref 150–400)
POTASSIUM SERPL-MCNC: 4.2 MMOL/L — SIGNIFICANT CHANGE UP (ref 3.5–5.3)
POTASSIUM SERPL-SCNC: 4.2 MMOL/L — SIGNIFICANT CHANGE UP (ref 3.5–5.3)
PROT SERPL-MCNC: 5.9 G/DL — LOW (ref 6–8.3)
RBC # BLD: 3.6 M/UL — LOW (ref 3.8–5.2)
RBC # FLD: 14.6 % — SIGNIFICANT CHANGE UP (ref 10.3–16.9)
SODIUM SERPL-SCNC: 140 MMOL/L — SIGNIFICANT CHANGE UP (ref 135–145)
TOTAL VOLUME - 24 HOUR: 4200 ML — SIGNIFICANT CHANGE UP
URATE SERPL-MCNC: 8 MG/DL — HIGH (ref 2.5–7)
URINE CREATININE CALCULATION: 0.6 G/24 H — LOW (ref 0.8–1.8)
WBC # BLD: 14.1 K/UL — HIGH (ref 3.8–10.5)
WBC # FLD AUTO: 14.1 K/UL — HIGH (ref 3.8–10.5)

## 2018-09-19 PROCEDURE — 71046 X-RAY EXAM CHEST 2 VIEWS: CPT | Mod: 26

## 2018-09-19 PROCEDURE — 99232 SBSQ HOSP IP/OBS MODERATE 35: CPT

## 2018-09-19 RX ORDER — LABETALOL HCL 100 MG
40 TABLET ORAL ONCE
Qty: 0 | Refills: 0 | Status: DISCONTINUED | OUTPATIENT
Start: 2018-09-19 | End: 2018-09-19

## 2018-09-19 RX ORDER — LABETALOL HCL 100 MG
300 TABLET ORAL THREE TIMES A DAY
Qty: 0 | Refills: 0 | Status: DISCONTINUED | OUTPATIENT
Start: 2018-09-19 | End: 2018-09-19

## 2018-09-19 RX ORDER — LABETALOL HCL 100 MG
400 TABLET ORAL EVERY 8 HOURS
Qty: 0 | Refills: 0 | Status: DISCONTINUED | OUTPATIENT
Start: 2018-09-19 | End: 2018-09-21

## 2018-09-19 RX ORDER — FUROSEMIDE 40 MG
20 TABLET ORAL ONCE
Qty: 0 | Refills: 0 | Status: COMPLETED | OUTPATIENT
Start: 2018-09-19 | End: 2018-09-19

## 2018-09-19 RX ORDER — NIFEDIPINE 30 MG
30 TABLET, EXTENDED RELEASE 24 HR ORAL DAILY
Qty: 0 | Refills: 0 | Status: DISCONTINUED | OUTPATIENT
Start: 2018-09-19 | End: 2018-09-21

## 2018-09-19 RX ORDER — HYDRALAZINE HCL 50 MG
10 TABLET ORAL ONCE
Qty: 0 | Refills: 0 | Status: COMPLETED | OUTPATIENT
Start: 2018-09-19 | End: 2018-09-19

## 2018-09-19 RX ADMIN — HEPARIN SODIUM 5000 UNIT(S): 5000 INJECTION INTRAVENOUS; SUBCUTANEOUS at 18:00

## 2018-09-19 RX ADMIN — HEPARIN SODIUM 5000 UNIT(S): 5000 INJECTION INTRAVENOUS; SUBCUTANEOUS at 06:18

## 2018-09-19 RX ADMIN — Medication 10 MILLIGRAM(S): at 05:05

## 2018-09-19 RX ADMIN — Medication 300 MILLIGRAM(S): at 12:19

## 2018-09-19 RX ADMIN — Medication 40 MILLIGRAM(S): at 05:00

## 2018-09-19 RX ADMIN — Medication 400 MILLIGRAM(S): at 18:18

## 2018-09-19 RX ADMIN — Medication 30 MILLIGRAM(S): at 12:19

## 2018-09-19 RX ADMIN — Medication 40 MILLIGRAM(S): at 01:20

## 2018-09-19 RX ADMIN — Medication 40 MILLIGRAM(S): at 09:00

## 2018-09-19 RX ADMIN — Medication 10 MILLIGRAM(S): at 11:10

## 2018-09-19 RX ADMIN — Medication 20 MILLIGRAM(S): at 17:00

## 2018-09-19 NOTE — PROGRESS NOTE ADULT - SUBJECTIVE AND OBJECTIVE BOX
[facility] DIVISION OF KIDNEY DISEASES AND HYPERTENSION -- FOLLOW UP NOTE  --------------------------------------------------------------------------------  34yo obese F with unclear PMH, poor historian,  3 elective abortions, h/o PEC in first pregnancy w different father, twin gestation c/b lifestyle controlled GHTN and GDM now req. emergent Csection at 34wks for severe PEC/breach:  No ab. pain, no n/v in 2d, passing gas, no BM but feels "one coming"  Severe and close to severe range bps this morning on max IV therapy. No headachje/blurry vision, thinks LE swelling improved. No scotoma, weakness.   All other ROS negative    PAST HISTORY  --------------------------------------------------------------------------------  No significant changes to PMH, PSH, FHx, SHx, unless otherwise noted    ALLERGIES & MEDICATIONS  --------------------------------------------------------------------------------  Allergies    NKDA    Standing Inpatient Medications  diphtheria/tetanus/pertussis (acellular) Vaccine (ADAcel) 0.5 milliLiter(s) IntraMuscular once  heparin  Injectable 5000 Unit(s) SubCutaneous every 12 hours  insulin lispro (HumaLOG) corrective regimen sliding scale   SubCutaneous Before meals and at bedtime  sodium chloride 0.9%. 1000 milliLiter(s) IV Continuous <Continuous>    PRN Inpatient Medications  lanolin Ointment 1 Application(s) Topical every 3 hours PRN  ondansetron Injectable 4 milliGRAM(s) IV Push every 6 hours PRN  simethicone 80 milliGRAM(s) Chew every 4 hours PRN      REVIEW OF SYSTEMS  --------------------------------------------------------------------------------  Gen: no fatigue, fevers/chills, weakness  Skin: No rashes  Head/Eyes/Ears/Mouth: No headache; Normal hearing; clinically blind; No sinus pain/discomfort, sore throat  Respiratory: No dyspnea, cough, wheezing, hemoptysis  CV: No chest pain, PND, orthopnea  GI: mild diffuse abdominal pain, no diarrhea, constipation, nausea, vomiting, melena, hematochezia  : estrella  MSK: No joint pain/swelling; no back pain; + edema  Neuro: No dizziness/lightheadedness, weakness, seizures, numbness, tingling  Heme: No easy bruising or bleeding  Endo: No heat/cold intolerance  Psych: No significant nervousness, anxiety, stress, depression    All other systems were reviewed and are negative, except as noted.    VITALS/PHYSICAL EXAM  --------------------------------------------------------------------------------  T(C): 36.8 (18 @ 13:15), Max: 37.6 (18 @ 23:17)  HR: 75 (18 @ 13:15) (67 - 100)  BP: 147/97 (18 @ 14:04) (119/78 - 176/101)  RR: 18 (18 @ 13:15) (16 - 24)  SpO2: 98% (18 @ 13:15) (92% - 98%)  Wt(kg): --    Weight (kg): 102.965 (18 @ 02:43)      18 @ 07:01  -  18 @ 07:00  --------------------------------------------------------  IN: 0 mL / OUT: 4680 mL / NET: -4680 mL    18 @ 07:01  -  18 @ 14:47  --------------------------------------------------------  IN: 0 mL / OUT: 410 mL / NET: -410 mL      Physical Exam:  	Gen: NAD, well-appearing  	HEENT: aniridia  	Pulm: CTA B/L  	CV: RRR, S1S2; no rub  	Abd: +BS, soft, nontender, non distended  	: estrella with dilute urine  	UE: Warm, FROM, no clubbing, intact strength; no edema; no asterixis  	LE: Warm, FROM, no clubbing, intact strength; 2+ edema up to shins  	Neuro: No focal deficits, AAOx3  	Psych: Normal affect and mood  	Skin: Warm, without rashes      LABS/STUDIES  --------------------------------------------------------------------------------              9.3    15.6  >-----------<  237      [18 @ 07:48]              29.7     133  |  96  |  13  ----------------------------<  157      [18 @ 07:48]  4.4   |  27  |  1.15        Ca     8.1     [18 @ 07:48]      Mg     7.3     [18 07:16]    TPro  5.6  /  Alb  2.4  /  TBili  0.3  /  DBili  x   /  AST  18  /  ALT  7   /  AlkPhos  79  [18 07:48]    PT/INR: PT 10.8 , INR 0.97       [18 07:48]  PTT: 27.6       [18 07:48]    Uric acid 8.2      [18 07:48]        [18 07:48]    Creatinine Trend:  SCr 1.15 [:48]  SCr 1.15 [ 19:10]  SCr 1.11 [:16]  SCr 1.04 [ 18:04]  SCr 1.03 [ 10:24]    Urinalysis - [18 18:04]      Color Yellow / Appearance Clear / SG >=1.030 / pH 5.5      Gluc NEGATIVE / Ketone 15  / Bili Negative / Urobili 0.2       Blood Large / Protein 100 / Leuk Est NEGATIVE / Nitrite NEGATIVE      RBC > 10 / WBC < 5 / Hyaline Few / Gran Occasional / Sq Epi  / Non Sq Epi 0-5 / Bacteria Present    Urine Creatinine 74      [18 @ 18:13]  Urine Protein 171      [18 18:13]        Syphilis Screen (Treponema Pallidum Ab) Negative      [18 @ 22:46]

## 2018-09-19 NOTE — LACTATION INITIAL EVALUATION - ADDITIONAL HEALTH HISTORY COMMENTS
Mom has an illness called Anirida Syndrome, She is legally blind, has pulmonary HTN. She also is recovering from an illeus.

## 2018-09-19 NOTE — PROGRESS NOTE ADULT - ASSESSMENT
33y Female POD#3 after C/S )umcomplicated) for breech of the second twin, preeclampsia w/ severe features (BPs) s/p IV Mg, followed by nephrology and pulmonology, postop course also c/b ileus.  - distention: NPO, abdominal exam improved - still not passing gas but abdomen is softer and less distended. Patient has not vomited since overnight. Will keep NPO and consider trial of clears later this afternoon if still no vomiting. If patient's distension worsens or she continues to have episodes of vomiting, will consider placing NG tube.   - sPEC: per nephrology while NPO: IV labetalol 40 mg q4h if >140/90 and HR>60, not to exceed 300 mg/24hrs (was given at 01:00/ 05:00, hydralazine IVP in between labetalol pushes (was given at 04:00). Labs stable w/ creatinine 1.15. Continue 24 hr urine collection (was restarted).   - Severe pulmonary HTN also pulmonary edema. s/p pulm consult, appreciate recs. D/C lasix 20 mg IVP. M/P suspect 2/2 fluid overload, will also r/o thrombus and autoimmune as cause of pulm htn.     Pt is to have CXR this morning                            Continue:  1. Neuro/Pain:  OPM  2  CV:  VS d2 hours  3. Pulm: Encourage ISS & Ambulation  4. DVT ppx: SCDs, SQH 5000 mg BID  5. Dispo: POD #3 or #4- diabetes: ISS, consider endocrinology consult if continues to require corrective insulin                1. Neuro/Pain:  OPM  2  CV:  VS per routine  3. Pulm: Encourage ISS & Ambulation  4. GI:  Reg  5. : Voiding  6. DVT ppx: SCDs, SQH 5000 mg BID  7. Dispo: POD #3 or #4

## 2018-09-19 NOTE — PROGRESS NOTE ADULT - SUBJECTIVE AND OBJECTIVE BOX
Patient seen at bedside. Denies complaints of HA, changes in vision, SOB, abdominal pain, swelling. Also denies CP, palpitations.     ICU Vital Signs Last 24 Hrs  T(C): 37.2 (19 Sep 2018 05:00), Max: 37.6 (18 Sep 2018 20:46)  T(F): 99 (19 Sep 2018 05:00), Max: 99.7 (18 Sep 2018 20:46)  HR: 70 (19 Sep 2018 07:00) (69 - 86)  BP: 169/93 (19 Sep 2018 07:00) (146/85 - 171/88)  BP(mean): --  ABP: --  ABP(mean): --  RR: 20 (19 Sep 2018 07:00) (17 - 20)  SpO2: 97% (19 Sep 2018 01:00) (96% - 100%)    PE  gen: well appearing, in no acute distress, sitting up in chair

## 2018-09-19 NOTE — PROGRESS NOTE ADULT - ASSESSMENT
34yo obese F with unclear PMH, poor historian,  3 elective abortions, h/o PEC in first pregnancy w different father, twin gestation c/b lifestyle controlled GHTN and GDM now req. emergent Csection at 34wks for severe PEC/breach:    # Severe PEC  - improved aside from bp, switch to 300mg TID Labetalol with holding parameters not to give if bp <110/80 and 30mg Nifedipine daily. Goal bp <150/100  - optimize pain control to help manage bp as well, and d/c IVF and estrella  - MINDY resolved, f/u 24hr CrCl/protein

## 2018-09-19 NOTE — PROGRESS NOTE ADULT - SUBJECTIVE AND OBJECTIVE BOX
Patient evaluated at bedside. Overall feeling better today. Bliss out, switched to oral meds and is tolerating w/o nausea/vomiting. Ambulating and passing flatus. Denies SOB.   She reports pain is well controlled.    She denies HA, dizziness, CP, palpitations, SOB, n/v, or heavy vaginal bleeding.    Physical Exam:  Vital Signs Last 24 Hrs  T(C): 36.7 (19 Sep 2018 15:00), Max: 37.6 (18 Sep 2018 20:46)  T(F): 98.1 (19 Sep 2018 15:00), Max: 99.7 (18 Sep 2018 20:46)  HR: 89 (19 Sep 2018 18:30) (68 - 90)  BP: 140/85 (19 Sep 2018 18:30) (140/85 - 176/79)  BP(mean): --  RR: 18 (19 Sep 2018 18:30) (17 - 20)  SpO2: 97% (19 Sep 2018 01:00) (96% - 100%)    Gen: NAD  chest: lungs CTAB, RRR no M/R/G  Abd: + BS, soft, nontender, moderately distended but slight improvement from yesterday, no rebound or guarding. Hyacinth and infraumbilical skin thickened and leathery  Incision clean, dry and intact  uterus firm at midline  : lochia WNL  Extremities: 2+ pitting edema bilaterally                          9.8    14.1  )-----------( 296      ( 19 Sep 2018 05:50 )             32.0   09-19    140  |  103  |  12  ----------------------------<  113<H>  4.2   |  26  |  0.85    Ca    8.5      19 Sep 2018 05:50    TPro  5.9<L>  /  Alb  2.4<L>  /  TBili  0.4  /  DBili  x   /  AST  15  /  ALT  8<L>  /  AlkPhos  78  09-19    I&O's Summary    18 Sep 2018 07:01  -  19 Sep 2018 07:00  --------------------------------------------------------  IN: 0 mL / OUT: 4185 mL / NET: -4185 mL    19 Sep 2018 07:01  -  19 Sep 2018 19:59  --------------------------------------------------------  IN: 0 mL / OUT: 2150 mL / NET: -2150 mL    MEDICATIONS  (STANDING):  diphtheria/tetanus/pertussis (acellular) Vaccine (ADAcel) 0.5 milliLiter(s) IntraMuscular once  heparin  Injectable 5000 Unit(s) SubCutaneous every 12 hours  influenza   Vaccine 0.5 milliLiter(s) IntraMuscular once  insulin lispro (HumaLOG) corrective regimen sliding scale   SubCutaneous Before meals and at bedtime  labetalol 400 milliGRAM(s) Oral every 8 hours  NIFEdipine XL 30 milliGRAM(s) Oral daily    MEDICATIONS  (PRN):  lanolin Ointment 1 Application(s) Topical every 3 hours PRN Sore Nipples  ondansetron Injectable 4 milliGRAM(s) IV Push every 6 hours PRN Nausea  simethicone 80 milliGRAM(s) Chew every 4 hours PRN Gas

## 2018-09-19 NOTE — PROGRESS NOTE ADULT - SUBJECTIVE AND OBJECTIVE BOX
INTERVAL HISTORY:  Patient seen and examined at bedside    VITAL SIGNS:  ICU Vital Signs Last 24 Hrs  T(C): 37.2 (19 Sep 2018 05:00), Max: 37.6 (18 Sep 2018 20:46)  T(F): 99 (19 Sep 2018 05:00), Max: 99.7 (18 Sep 2018 20:46)  HR: 80 (19 Sep 2018 05:00) (69 - 86)  BP: 158/86 (19 Sep 2018 05:00) (146/85 - 172/100)  BP(mean): --  ABP: --  ABP(mean): --  RR: 20 (19 Sep 2018 05:00) (16 - 20)  SpO2: 97% (19 Sep 2018 01:00) (93% - 100%)        09-18 @ 07:01  -  09-19 @ 07:00  --------------------------------------------------------  IN: 0 mL / OUT: 3460 mL / NET: -3460 mL      CAPILLARY BLOOD GLUCOSE      POCT Blood Glucose.: 107 mg/dL (19 Sep 2018 07:24)      PHYSICAL EXAM:  Constitutional: resting comfortably in bed, NAD  HEENT: NC/AT; PERRL, anicteric sclera; no oropharyngeal erythema or exudates; MMM  Neck: supple, no JVD  Respiratory: CTA B/L, no W/R/R; respirations appear non-labored, speaking full sentences  Cardiovascular: +S1/S2, RRR  Gastrointestinal: abdomen soft, NT/ND; +BS x4  Extremities: WWP; no clubbing, cyanosis or edema  Vascular: 2+ radial, DP/PT and femoral pulses B/L      MEDICATIONS:  MEDICATIONS  (STANDING):  diphtheria/tetanus/pertussis (acellular) Vaccine (ADAcel) 0.5 milliLiter(s) IntraMuscular once  heparin  Injectable 5000 Unit(s) SubCutaneous every 12 hours  influenza   Vaccine 0.5 milliLiter(s) IntraMuscular once  insulin lispro (HumaLOG) corrective regimen sliding scale   SubCutaneous Before meals and at bedtime  labetalol Injectable 40 milliGRAM(s) IV Push once  labetalol Injectable 40 milliGRAM(s) IV Push every 4 hours  sodium chloride 0.9%. 1000 milliLiter(s) (50 mL/Hr) IV Continuous <Continuous>    MEDICATIONS  (PRN):  lanolin Ointment 1 Application(s) Topical every 3 hours PRN Sore Nipples  ondansetron Injectable 4 milliGRAM(s) IV Push every 6 hours PRN Nausea  simethicone 80 milliGRAM(s) Chew every 4 hours PRN Gas      ALLERGIES:  Allergies    Allergy Status Unknown    Intolerances        LABS:                        9.8    14.1  )-----------( 296      ( 19 Sep 2018 05:50 )             32.0     09-19    140  |  103  |  12  ----------------------------<  113<H>  4.2   |  26  |  0.85    Ca    8.5      19 Sep 2018 05:50    TPro  5.9<L>  /  Alb  2.4<L>  /  TBili  0.4  /  DBili  x   /  AST  15  /  ALT  8<L>  /  AlkPhos  78  09-19    PT/INR - ( 18 Sep 2018 07:48 )   PT: 10.8 sec;   INR: 0.97          PTT - ( 18 Sep 2018 07:48 )  PTT:27.6 sec      RADIOLOGY & ADDITIONAL TESTS: INTERVAL HISTORY:  Patient seen and examined at bedside. Feels relatively well aside from persistent abdominal wound pain. Still reports orthopnea that happens after laying down for "a while."    VITAL SIGNS:  ICU Vital Signs Last 24 Hrs  T(C): 37.2 (19 Sep 2018 05:00), Max: 37.6 (18 Sep 2018 20:46)  T(F): 99 (19 Sep 2018 05:00), Max: 99.7 (18 Sep 2018 20:46)  HR: 80 (19 Sep 2018 05:00) (69 - 86)  BP: 158/86 (19 Sep 2018 05:00) (146/85 - 172/100)  BP(mean): --  ABP: --  ABP(mean): --  RR: 20 (19 Sep 2018 05:00) (16 - 20)  SpO2: 97% (19 Sep 2018 01:00) (93% - 100%)        09-18 @ 07:01  -  09-19 @ 07:00  --------------------------------------------------------  IN: 0 mL / OUT: 3460 mL / NET: -3460 mL      CAPILLARY BLOOD GLUCOSE      POCT Blood Glucose.: 107 mg/dL (19 Sep 2018 07:24)      PHYSICAL EXAM:  Constitutional: resting comfortably in bed, NAD  HEENT: NC/AT; PERRL, anicteric sclera; no oropharyngeal erythema or exudates; MMM  Neck: supple, no JVD; mild HJH   Respiratory: CTA B/L, no W/R/R; respirations appear non-labored, speaking full sentences  Cardiovascular: +S1/S2, RRR  Gastrointestinal: abdomen soft but tender at the lower quadrants; ND; +BS x4  Extremities: WWP; persistently edematous LE's bilaterally  Vascular: 2+ radial, DP/PT and femoral pulses B/L      MEDICATIONS:  MEDICATIONS  (STANDING):  diphtheria/tetanus/pertussis (acellular) Vaccine (ADAcel) 0.5 milliLiter(s) IntraMuscular once  heparin  Injectable 5000 Unit(s) SubCutaneous every 12 hours  influenza   Vaccine 0.5 milliLiter(s) IntraMuscular once  insulin lispro (HumaLOG) corrective regimen sliding scale   SubCutaneous Before meals and at bedtime  labetalol Injectable 40 milliGRAM(s) IV Push once  labetalol Injectable 40 milliGRAM(s) IV Push every 4 hours  sodium chloride 0.9%. 1000 milliLiter(s) (50 mL/Hr) IV Continuous <Continuous>    MEDICATIONS  (PRN):  lanolin Ointment 1 Application(s) Topical every 3 hours PRN Sore Nipples  ondansetron Injectable 4 milliGRAM(s) IV Push every 6 hours PRN Nausea  simethicone 80 milliGRAM(s) Chew every 4 hours PRN Gas      ALLERGIES:  Allergies    Allergy Status Unknown    Intolerances        LABS:                        9.8    14.1  )-----------( 296      ( 19 Sep 2018 05:50 )             32.0     09-19    140  |  103  |  12  ----------------------------<  113<H>  4.2   |  26  |  0.85    Ca    8.5      19 Sep 2018 05:50    TPro  5.9<L>  /  Alb  2.4<L>  /  TBili  0.4  /  DBili  x   /  AST  15  /  ALT  8<L>  /  AlkPhos  78  09-19    PT/INR - ( 18 Sep 2018 07:48 )   PT: 10.8 sec;   INR: 0.97          PTT - ( 18 Sep 2018 07:48 )  PTT:27.6 sec      RADIOLOGY & ADDITIONAL TESTS:   CXR reviewed from today - improvement of vascular congestion seen with previous CXR.

## 2018-09-19 NOTE — PROGRESS NOTE ADULT - ASSESSMENT
33y Female POD#3 after C/S )umcomplicated) for breech of the second twin, preeclampsia w/ severe features (BPs) s/p IV Mg, followed by nephrology and pulmonology, postop course also c/b ileus.  - distention: improved exam and now tolerating clears, passing flatus  - sPEC: poorly controlled today, transitioned to labetalol 400 TID, nifedipine 30 xl qd. IV pushes prn. Appreciate nephrology recs.   - Severe pulmonary HTN also pulmonary edema. chest x ray improved today, patient asymptomatic s/p pulm consult, appreciate recs. Another dose of 20 mg IV lasix given this evening.  M/P suspect 2/2 fluid overload, will also r/o thrombus and autoimmune as cause of pulm htn. Repeat echo and LE dopplers before discharge  -Diabetes: FS improved today, ISS

## 2018-09-19 NOTE — LACTATION INITIAL EVALUATION - INTERVENTION OUTCOME
Needs assistance as she is not able to see very well/verbalizes understanding/demonstrates understanding of teaching

## 2018-09-19 NOTE — LACTATION INITIAL EVALUATION - INFANT FEEDING PLAN COMMENT, OB PROFILE
Mom will try to use the breast pump. It is very hard for her to see the pieces and may need some assistance in the beginning

## 2018-09-19 NOTE — PROGRESS NOTE ADULT - PROBLEM SELECTOR PLAN 1
Discussed with renal team last night and discovered that there were discrepancies in obtained histories between us. Per them, the patient does not have orthopnea or HANSEN. She also has been having decreased PO intake and vomiting occasionally, attributed to ileus secondary to the . In discussion with nephrology, decision made to hold diuresis and repeat a chest x-ray today to see the progression of her pulmonary congestion. As was mentioned yesterday, currently no obvious clues to favor cardiac, primary pulmonary, connective tissue disease, thromboembolic, or other causes. Patient received 1 dose of 20mg lasix prior to current CXR (40mg total since last x-ray) with improved CXR. However, she still reports orthopnea and exertional dyspnea. We had discussed with the renal team last night and discovered that there were discrepancies in obtained histories between us. Per them, the patient did not report orthopnea or HANSEN. She also has been having decreased PO intake and vomiting occasionally, attributed to ileus secondary to the . In discussion with nephrology, decision made to hold diuresis and repeat a chest x-ray today to see the progression of her pulmonary congestion. As was mentioned yesterday, currently no obvious clues to favor cardiac, primary pulmonary, connective tissue disease, thromboembolic, or other causes. Given improvement of her MINDY, we can continue to diurese as seen fit by nephrology.   Pending lab results as listed in yesterday's note.  - Recommend also 6MWT and check saturations then prior to discharge to assess for home O2 need.

## 2018-09-19 NOTE — PROGRESS NOTE ADULT - ASSESSMENT
34 yo [id number] obese F with congenital aniridia, gestational DM, initially admitted for active labor complicated by pre-eclampsia s/p  on . We are consulted for pulmonary hypertension

## 2018-09-19 NOTE — PROGRESS NOTE ADULT - ATTENDING COMMENTS
34 yo  obese F with congenital aniridia, gestational DM, initially admitted for active labor. Labor was  complicated by pre-eclampsia. She is  s/p  on .   We are consulted for pulmonary hypertension. Her ECHO shows:  Normal left ventricular size and wall thickness.  The left ventricular ejection fraction is estimated to   be 60-65% Right atrial size is normal. There is severe tricuspid regurgitation.The pulmonary artery systolic pressure is  estimated to be 58 mmHg. Structurally normal pulmonic valve. There is mild   pulmonic regurgitation. There is no pericardial effusion.    I concur withy the physical exam. Her JVD is not elevated in the sitting position now. She has 2(+) pitting edema. Her MP score is 4. Crackles are not auscultated now. Her air entry is better  P2 is loud, not split. No murmurs. She is legally blind.    CXR reviewed: Significant improvement noted. from yesterday's film. No pleural effusions     Assessment:  The patient had no symptoms of dyspnea before pregnancy. No PE in herself or anyone in family. No H/O rheumatological conditions diagnosed in her or in the family.   I suspect she is significantly volume overloaded based on her clinical exam and CXR. The elevated PASP is most likely reflective of that.   Suggest:  --diuresis to continue as clinically indicated  --After pt is diuresed will recommend repeating ECHO  --If PASP is elevated in an euvolemic state, will start a full work up. Send serologies (screening) : ELIECER, Rheumatoid factor, CH50  --Walk test should be done prior to discharge to assess if pt would need O2 on discharge (recommend doing it prior to discharge after she has been diuresed)  --Will evaluate pt within 2 weeks of discharge from hospital  --As out-patient will get 6MWT, full PFT's and DLCO as well as sleep test

## 2018-09-20 LAB
ALBUMIN SERPL ELPH-MCNC: 2.6 G/DL — LOW (ref 3.3–5)
ALP SERPL-CCNC: 69 U/L — SIGNIFICANT CHANGE UP (ref 40–120)
ALT FLD-CCNC: 9 U/L — LOW (ref 10–45)
ANION GAP SERPL CALC-SCNC: 13 MMOL/L — SIGNIFICANT CHANGE UP (ref 5–17)
AST SERPL-CCNC: 14 U/L — SIGNIFICANT CHANGE UP (ref 10–40)
B2 MICROGLOB SERPL-MCNC: 2.6 MG/L — HIGH (ref 0.8–2.2)
BASOPHILS NFR BLD AUTO: 0.2 % — SIGNIFICANT CHANGE UP (ref 0–2)
BILIRUB SERPL-MCNC: 0.4 MG/DL — SIGNIFICANT CHANGE UP (ref 0.2–1.2)
BUN SERPL-MCNC: 11 MG/DL — SIGNIFICANT CHANGE UP (ref 7–23)
CALCIUM SERPL-MCNC: 8.6 MG/DL — SIGNIFICANT CHANGE UP (ref 8.4–10.5)
CHLORIDE SERPL-SCNC: 99 MMOL/L — SIGNIFICANT CHANGE UP (ref 96–108)
CO2 SERPL-SCNC: 27 MMOL/L — SIGNIFICANT CHANGE UP (ref 22–31)
CREAT CL ?TM UR+SERPL-VRATE: 36 ML/MIN — LOW (ref 75–115)
CREAT SERPL-MCNC: 0.72 MG/DL — SIGNIFICANT CHANGE UP (ref 0.5–1.3)
CREAT SERPL-MCNC: 1.15 MG/DL — SIGNIFICANT CHANGE UP (ref 0.5–1.3)
EOSINOPHIL NFR BLD AUTO: 1.6 % — SIGNIFICANT CHANGE UP (ref 0–6)
EXTRA GREEN TOP TUBE: SIGNIFICANT CHANGE UP
GLUCOSE BLDC GLUCOMTR-MCNC: 127 MG/DL — HIGH (ref 70–99)
GLUCOSE BLDC GLUCOMTR-MCNC: 150 MG/DL — HIGH (ref 70–99)
GLUCOSE BLDC GLUCOMTR-MCNC: 169 MG/DL — HIGH (ref 70–99)
GLUCOSE BLDC GLUCOMTR-MCNC: 174 MG/DL — HIGH (ref 70–99)
GLUCOSE BLDC GLUCOMTR-MCNC: 180 MG/DL — HIGH (ref 70–99)
GLUCOSE SERPL-MCNC: 144 MG/DL — HIGH (ref 70–99)
HCT VFR BLD CALC: 29.8 % — LOW (ref 34.5–45)
HGB BLD-MCNC: 9.2 G/DL — LOW (ref 11.5–15.5)
LYMPHOCYTES # BLD AUTO: 12.9 % — LOW (ref 13–44)
MCHC RBC-ENTMCNC: 27.6 PG — SIGNIFICANT CHANGE UP (ref 27–34)
MCHC RBC-ENTMCNC: 30.9 G/DL — LOW (ref 32–36)
MCV RBC AUTO: 89.5 FL — SIGNIFICANT CHANGE UP (ref 80–100)
MONOCYTES NFR BLD AUTO: 8.1 % — SIGNIFICANT CHANGE UP (ref 2–14)
NEUTROPHILS NFR BLD AUTO: 77.2 % — HIGH (ref 43–77)
PLATELET # BLD AUTO: 355 K/UL — SIGNIFICANT CHANGE UP (ref 150–400)
POTASSIUM SERPL-MCNC: 4.3 MMOL/L — SIGNIFICANT CHANGE UP (ref 3.5–5.3)
POTASSIUM SERPL-SCNC: 4.3 MMOL/L — SIGNIFICANT CHANGE UP (ref 3.5–5.3)
PROT SERPL-MCNC: 5.4 G/DL — LOW (ref 6–8.3)
RBC # BLD: 3.33 M/UL — LOW (ref 3.8–5.2)
RBC # FLD: 14.9 % — SIGNIFICANT CHANGE UP (ref 10.3–16.9)
RHEUMATOID FACT SERPL-ACNC: <10 IU/ML — SIGNIFICANT CHANGE UP (ref 0–13)
SODIUM SERPL-SCNC: 139 MMOL/L — SIGNIFICANT CHANGE UP (ref 135–145)
URATE SERPL-MCNC: 7.1 MG/DL — HIGH (ref 2.5–7)
WBC # BLD: 12.2 K/UL — HIGH (ref 3.8–10.5)
WBC # FLD AUTO: 12.2 K/UL — HIGH (ref 3.8–10.5)

## 2018-09-20 PROCEDURE — 93970 EXTREMITY STUDY: CPT | Mod: 26

## 2018-09-20 PROCEDURE — 99232 SBSQ HOSP IP/OBS MODERATE 35: CPT

## 2018-09-20 PROCEDURE — 93306 TTE W/DOPPLER COMPLETE: CPT | Mod: 26

## 2018-09-20 RX ORDER — FUROSEMIDE 40 MG
20 TABLET ORAL ONCE
Qty: 0 | Refills: 0 | Status: COMPLETED | OUTPATIENT
Start: 2018-09-20 | End: 2018-09-20

## 2018-09-20 RX ADMIN — HEPARIN SODIUM 5000 UNIT(S): 5000 INJECTION INTRAVENOUS; SUBCUTANEOUS at 18:28

## 2018-09-20 RX ADMIN — Medication 30 MILLIGRAM(S): at 05:48

## 2018-09-20 RX ADMIN — HEPARIN SODIUM 5000 UNIT(S): 5000 INJECTION INTRAVENOUS; SUBCUTANEOUS at 05:48

## 2018-09-20 RX ADMIN — Medication 400 MILLIGRAM(S): at 09:52

## 2018-09-20 RX ADMIN — Medication 20 MILLIGRAM(S): at 15:22

## 2018-09-20 RX ADMIN — Medication 400 MILLIGRAM(S): at 18:27

## 2018-09-20 RX ADMIN — Medication 2: at 21:09

## 2018-09-20 RX ADMIN — Medication 400 MILLIGRAM(S): at 02:05

## 2018-09-20 RX ADMIN — Medication 2: at 11:50

## 2018-09-20 NOTE — PROGRESS NOTE ADULT - SUBJECTIVE AND OBJECTIVE BOX
Patient evaluated at bedside this morning.  Reports significant improvement to abdominal pain.  Tolerating clear liquid diet without n/v and is ambulating without difficulty.  Passing flatus with regular bowel movement earlier in AM.   She denies headache, visual disturbances, dizziness, chest pain, palpitations, shortness of breath, or heavy vaginal bleeding.      Physical Exam:  Vital Signs Last 24 Hrs  T(C): 36.4 (20 Sep 2018 10:00), Max: 36.7 (19 Sep 2018 15:00)  T(F): 97.5 (20 Sep 2018 10:00), Max: 98.1 (19 Sep 2018 15:00)  HR: 82 (20 Sep 2018 10:00) (67 - 90)  BP: 155/79 (20 Sep 2018 10:00) (124/76 - 169/85)  RR: 18 (20 Sep 2018 10:00) (18 - 20)    GA: NAD, A+0 x 3  CV: RRR, clear s1 s2  Pulm: CTAB  Abd: + BS, soft, nontender, nondistended, no rebound or guarding, prevena dressing in place, uterus firm at midline at level of umbilicus  : lochia WNL  Extremities: +2 pitting edema bilateral                            9.2    12.2  )-----------( 355      ( 20 Sep 2018 06:31 )             29.8     09-20    139  |  99  |  11  ----------------------------<  144<H>  4.3   |  27  |  0.72    Ca    8.6      20 Sep 2018 07:03    TPro  5.4<L>  /  Alb  2.6<L>  /  TBili  0.4  /  DBili  x   /  AST  14  /  ALT  9<L>  /  AlkPhos  69  09-20        diphtheria/tetanus/pertussis (acellular) Vaccine (ADAcel) 0.5 milliLiter(s) IntraMuscular once  heparin  Injectable 5000 Unit(s) SubCutaneous every 12 hours  influenza   Vaccine 0.5 milliLiter(s) IntraMuscular once  insulin lispro (HumaLOG) corrective regimen sliding scale   SubCutaneous Before meals and at bedtime  labetalol 400 milliGRAM(s) Oral every 8 hours  lanolin Ointment 1 Application(s) Topical every 3 hours PRN  NIFEdipine XL 30 milliGRAM(s) Oral daily  ondansetron Injectable 4 milliGRAM(s) IV Push every 6 hours PRN  simethicone 80 milliGRAM(s) Chew every 4 hours PRN Patient evaluated at bedside this morning.  Reports significant improvement of abdominal pain.  Tolerating clear liquid diet and PO meds without n/v and is ambulating without difficulty.  Passing flatus with regular bowel movement earlier in AM.   She denies headache, visual disturbances, dizziness, chest pain, palpitations, shortness of breath, or heavy vaginal bleeding.      Physical Exam:  Vital Signs Last 24 Hrs  T(C): 36.4 (20 Sep 2018 10:00), Max: 36.7 (19 Sep 2018 15:00)  T(F): 97.5 (20 Sep 2018 10:00), Max: 98.1 (19 Sep 2018 15:00)  HR: 82 (20 Sep 2018 10:00) (67 - 90)  BP: 155/79 (20 Sep 2018 10:00) (124/76 - 169/85)  RR: 18 (20 Sep 2018 10:00) (18 - 20)    GA: NAD, A+0 x 3  CV: RRR, clear s1 s2  Pulm: CTAB  Abd: + BS, soft, nontender, nondistended, no rebound or guarding, prevena dressing in place, uterus firm at midline at level of umbilicus  : lochia WNL  Extremities: +2 pitting edema bilateral                            9.2    12.2  )-----------( 355      ( 20 Sep 2018 06:31 )             29.8     09-20    139  |  99  |  11  ----------------------------<  144<H>  4.3   |  27  |  0.72    Ca    8.6      20 Sep 2018 07:03    TPro  5.4<L>  /  Alb  2.6<L>  /  TBili  0.4  /  DBili  x   /  AST  14  /  ALT  9<L>  /  AlkPhos  69  09-20        diphtheria/tetanus/pertussis (acellular) Vaccine (ADAcel) 0.5 milliLiter(s) IntraMuscular once  heparin  Injectable 5000 Unit(s) SubCutaneous every 12 hours  influenza   Vaccine 0.5 milliLiter(s) IntraMuscular once  insulin lispro (HumaLOG) corrective regimen sliding scale   SubCutaneous Before meals and at bedtime  labetalol 400 milliGRAM(s) Oral every 8 hours  lanolin Ointment 1 Application(s) Topical every 3 hours PRN  NIFEdipine XL 30 milliGRAM(s) Oral daily  ondansetron Injectable 4 milliGRAM(s) IV Push every 6 hours PRN  simethicone 80 milliGRAM(s) Chew every 4 hours PRN

## 2018-09-20 NOTE — PROGRESS NOTE ADULT - SUBJECTIVE AND OBJECTIVE BOX
[facility] DIVISION OF KIDNEY DISEASES AND HYPERTENSION -- FOLLOW UP NOTE  --------------------------------------------------------------------------------  34yo obese F with unclear PMH, poor historian,  3 elective abortions, h/o PEC in first pregnancy w different father, twin gestation c/b lifestyle controlled GHTN and GDM now req. emergent Csection at 34wks for severe PEC/breach:    All other ROS negative    PAST HISTORY  --------------------------------------------------------------------------------  No significant changes to PMH, PSH, FHx, SHx, unless otherwise noted    ALLERGIES & MEDICATIONS  --------------------------------------------------------------------------------  Allergies    NKDA    Standing Inpatient Medications  diphtheria/tetanus/pertussis (acellular) Vaccine (ADAcel) 0.5 milliLiter(s) IntraMuscular once  heparin  Injectable 5000 Unit(s) SubCutaneous every 12 hours  insulin lispro (HumaLOG) corrective regimen sliding scale   SubCutaneous Before meals and at bedtime  sodium chloride 0.9%. 1000 milliLiter(s) IV Continuous <Continuous>    PRN Inpatient Medications  lanolin Ointment 1 Application(s) Topical every 3 hours PRN  ondansetron Injectable 4 milliGRAM(s) IV Push every 6 hours PRN  simethicone 80 milliGRAM(s) Chew every 4 hours PRN      REVIEW OF SYSTEMS  --------------------------------------------------------------------------------  Gen: no fatigue, fevers/chills, weakness  Skin: No rashes  Head/Eyes/Ears/Mouth: No headache; Normal hearing; clinically blind; No sinus pain/discomfort, sore throat  Respiratory: No dyspnea, cough, wheezing, hemoptysis  CV: No chest pain, PND, orthopnea  GI: mild diffuse abdominal pain, no diarrhea, constipation, nausea, vomiting, melena, hematochezia  : estrella  MSK: No joint pain/swelling; no back pain; + edema  Neuro: No dizziness/lightheadedness, weakness, seizures, numbness, tingling  Heme: No easy bruising or bleeding  Endo: No heat/cold intolerance  Psych: No significant nervousness, anxiety, stress, depression    All other systems were reviewed and are negative, except as noted.    VITALS/PHYSICAL EXAM  --------------------------------------------------------------------------------  T(C): 36.8 (18 @ 13:15), Max: 37.6 (18 @ 23:17)  HR: 75 (18 @ 13:15) (67 - 100)  BP: 147/97 (18 @ 14:04) (119/78 - 176/101)  RR: 18 (18 @ 13:15) (16 - 24)  SpO2: 98% (18 @ 13:15) (92% - 98%)  Wt(kg): --    Weight (kg): 102.965 (18 @ 02:43)      18 @ 07:01  -  18 @ 07:00  --------------------------------------------------------  IN: 0 mL / OUT: 4680 mL / NET: -4680 mL    18 @ 07:01  -  18 @ 14:47  --------------------------------------------------------  IN: 0 mL / OUT: 410 mL / NET: -410 mL      Physical Exam:  	Gen: NAD, well-appearing  	HEENT: aniridia  	Pulm: CTA B/L  	CV: RRR, S1S2; no rub  	Abd: +BS, soft, nontender, non distended  	: estrella with dilute urine  	UE: Warm, FROM, no clubbing, intact strength; no edema; no asterixis  	LE: Warm, FROM, no clubbing, intact strength; 2+ edema up to shins  	Neuro: No focal deficits, AAOx3  	Psych: Normal affect and mood  	Skin: Warm, without rashes      LABS/STUDIES  --------------------------------------------------------------------------------              9.3    15.6  >-----------<  237      [18 @ 07:48]              29.7     133  |  96  |  13  ----------------------------<  157      [18 @ 07:48]  4.4   |  27  |  1.15        Ca     8.1     [18 @ 07:48]      Mg     7.3     [18 @ 07:16]    TPro  5.6  /  Alb  2.4  /  TBili  0.3  /  DBili  x   /  AST  18  /  ALT  7   /  AlkPhos  79  [18 @ 07:48]    PT/INR: PT 10.8 , INR 0.97       [18 07:48]  PTT: 27.6       [18 07:48]    Uric acid 8.2      [18 07:48]        [18 07:48]    Creatinine Trend:  SCr 1.15 [:48]  SCr 1.15 [ 19:10]  SCr 1.11 [ 07:16]  SCr 1.04 [ 18:04]  SCr 1.03 [ 10:24]    Urinalysis - [18 18:04]      Color Yellow / Appearance Clear / SG >=1.030 / pH 5.5      Gluc NEGATIVE / Ketone 15  / Bili Negative / Urobili 0.2       Blood Large / Protein 100 / Leuk Est NEGATIVE / Nitrite NEGATIVE      RBC > 10 / WBC < 5 / Hyaline Few / Gran Occasional / Sq Epi  / Non Sq Epi 0-5 / Bacteria Present    Urine Creatinine 74      [18 @ 18:13]  Urine Protein 171      [18 18:13]        Syphilis Screen (Treponema Pallidum Ab) Negative      [18 @ 22:46] [facility] DIVISION OF KIDNEY DISEASES AND HYPERTENSION -- FOLLOW UP NOTE  --------------------------------------------------------------------------------  34yo obese F with unclear PMH, poor historian,  3 elective abortions, h/o PEC in first pregnancy w different father, twin gestation c/b lifestyle controlled GHTN and GDM now req. emergent Csection at 34wks for severe PEC/breach:  Feeling much better today, eating well, no n/v, having formed BM. No headaches, blurry vision, chest pressure, SOB, no orthopnea or HANSEN. LE swelling seems improved.  All other ROS negative    PAST HISTORY  --------------------------------------------------------------------------------  No significant changes to PMH, PSH, FHx, SHx, unless otherwise noted    ALLERGIES & MEDICATIONS  --------------------------------------------------------------------------------  Allergies    NKDA    Standing Inpatient Medications  diphtheria/tetanus/pertussis (acellular) Vaccine (ADAcel) 0.5 milliLiter(s) IntraMuscular once  heparin  Injectable 5000 Unit(s) SubCutaneous every 12 hours  insulin lispro (HumaLOG) corrective regimen sliding scale   SubCutaneous Before meals and at bedtime  sodium chloride 0.9%. 1000 milliLiter(s) IV Continuous <Continuous>    PRN Inpatient Medications  lanolin Ointment 1 Application(s) Topical every 3 hours PRN  ondansetron Injectable 4 milliGRAM(s) IV Push every 6 hours PRN  simethicone 80 milliGRAM(s) Chew every 4 hours PRN      REVIEW OF SYSTEMS  --------------------------------------------------------------------------------  Gen: no fatigue, fevers/chills, weakness  Skin: No rashes  Head/Eyes/Ears/Mouth: No headache; Normal hearing; clinically blind; No sinus pain/discomfort, sore throat  Respiratory: No dyspnea, cough, wheezing, hemoptysis  CV: No chest pain, PND, orthopnea  GI: no abdominal pain aside from mild around incision site  : no difficulty urinating or dysuria  MSK: No joint pain/swelling; no back pain; + edema  Neuro: No dizziness/lightheadedness, weakness, seizures, numbness, tingling  Heme: No easy bruising or bleeding  Endo: No heat/cold intolerance  Psych: No significant nervousness, anxiety, stress, depression    All other systems were reviewed and are negative, except as noted.    VITALS/PHYSICAL EXAM  --------------------------------------------------------------------------------  T(C): 36.8 (18 @ 13:15), Max: 37.6 (18 @ 23:17)  HR: 75 (18 @ 13:15) (67 - 100)  BP: 147/97 (18 @ 14:04) (119/78 - 176/101)  RR: 18 (18 @ 13:15) (16 - 24)  SpO2: 98% (18 @ 13:15) (92% - 98%)  Wt(kg): --    Weight (kg): 102.965 (18 @ 02:43)      09-17-18 @ 07:01  -  18 @ 07:00  --------------------------------------------------------  IN: 0 mL / OUT: 4680 mL / NET: -4680 mL    18 @ 07:01  -  18 @ 14:47  --------------------------------------------------------  IN: 0 mL / OUT: 410 mL / NET: -410 mL      Physical Exam:  	Gen: NAD, well-appearing  	HEENT: aniridia  	Pulm: CTA B/L  	CV: RRR, S1S2; no rub  	Abd: +BS, soft, nontender, non distended, ab. drain  	UE: Warm, FROM, no clubbing, intact strength; no edema; no asterixis  	LE: Warm, FROM, no clubbing, intact strength; 1+ edema up to shins  	Neuro: No focal deficits, AAOx3  	Psych: Normal affect and mood  	Skin: Warm, without rashes      LABS/STUDIES  --------------------------------------------------------------------------------              9.3    15.6  >-----------<  237      [18 @ 07:48]              29.7     133  |  96  |  13  ----------------------------<  157      [18 @ 07:48]  4.4   |  27  |  1.15        Ca     8.1     [18 @ 07:48]      Mg     7.3     [18 @ 07:16]    TPro  5.6  /  Alb  2.4  /  TBili  0.3  /  DBili  x   /  AST  18  /  ALT  7   /  AlkPhos  79  [18 07:48]    PT/INR: PT 10.8 , INR 0.97       [18 07:48]  PTT: 27.6       [18 07:48]    Uric acid 8.2      [18 07:48]        [18 07:48]    Creatinine Trend:  SCr 1.15 [:48]  SCr 1.15 [ 19:10]  SCr 1.11 [ 07:16]  SCr 1.04 [ 18:04]  SCr 1.03 [ 10:24]    Urinalysis - [18 18:04]      Color Yellow / Appearance Clear / SG >=1.030 / pH 5.5      Gluc NEGATIVE / Ketone 15  / Bili Negative / Urobili 0.2       Blood Large / Protein 100 / Leuk Est NEGATIVE / Nitrite NEGATIVE      RBC > 10 / WBC < 5 / Hyaline Few / Gran Occasional / Sq Epi  / Non Sq Epi 0-5 / Bacteria Present    Urine Creatinine 74      [18 @ 18:13]  Urine Protein 171      [18 18:13]        Syphilis Screen (Treponema Pallidum Ab) Negative      [18 @ 22:46]

## 2018-09-20 NOTE — PROGRESS NOTE ADULT - PROBLEM SELECTOR PLAN 1
PA pressures improved slightly with diuresis. Lasix is currently held for reasons mentioned yesterday, but if she is still experiencing HANSEN more than baseline or orthopnea, would recommend another dose of 20mg Lasix as she still is overall hypervolemic and now with improved renal function that likely can handle the lasix. Again, currently no obvious clues to favor cardiac, primary pulmonary, connective tissue disease, thromboembolic, or other causes.     - diuresis PRN as above  - Awaiting serology for autoimmune disease  - Recommend also 6MWT and check saturations then prior to discharge to assess for home O2 need.  - Once discharged, can follow up with us on outpatient setting in 2 weeks.

## 2018-09-20 NOTE — PROGRESS NOTE ADULT - SUBJECTIVE AND OBJECTIVE BOX
Patient evaluated at bedside this morning.  Reports significant improvement of abdominal pain.  Tolerating reg diet, ambulating, voiding spontaneously, passing flatus with regular bowel movements today. Symptomatically improved, echo w/ PAH 50 mmHg, dopplers performed. She denies headache, visual disturbances, dizziness, chest pain, palpitations, shortness of breath, or heavy vaginal bleeding.      Vital Signs Last 24 Hrs  T(C): 36.2 (20 Sep 2018 18:30), Max: 36.6 (19 Sep 2018 21:30)  T(F): 97.2 (20 Sep 2018 18:30), Max: 97.9 (19 Sep 2018 21:30)  HR: 78 (20 Sep 2018 15:06) (67 - 88)  BP: 152/78 (20 Sep 2018 18:30) (124/76 - 155/79)  BP(mean): --  RR: 18 (20 Sep 2018 18:30) (18 - 20)  SpO2: 99% (20 Sep 2018 18:30) (98% - 99%)    GA: NAD, A+0 x 3  CV: RRR, clear s1 s2  Pulm: CTAB  Abd: + BS, soft, nontender, moderately distended but improved, no rebound or guarding, prevena dressing in place, uterus firm at midline at level of umbilicus  : lochia WNL  Extremities: +2 pitting edema bilateral                            9.2    12.2  )-----------( 355      ( 20 Sep 2018 06:31 )             29.8         139  |  99  |  11  ----------------------------<  144<H>  4.3   |  27  |  0.72    Ca    8.6      20 Sep 2018 07:03    TPro  5.4<L>  /  Alb  2.6<L>  /  TBili  0.4  /  DBili  x   /  AST  14  /  ALT  9<L>  /  AlkPhos  69      < from: US Duplex Venous Lower Ext Complete, Bilateral (18 @ 17:47) >  EXAM:  US DPLX LWR EXT VEINS COMPL BI                          PROCEDURE DATE:  2018          INTERPRETATION:    VENOUS DUPLEX DOPPLER OF BOTH LOWER EXTREMITIES dated 2018 5:47 PM    INDICATION: Status post partum by  section with pulmonary   hypertension and bilateral lower extremity edema.    TECHNIQUE: Duplex Doppler evaluation including gray-scale ultrasound   imaging, color flow Doppler imaging, and Doppler spectral analysis of the   veins of both lower extremities was performed.     COMPARISON: None    FINDINGS:    Thigh veins: The common femoral, femoral, popliteal, proximal greater   saphenous, and proximal deep femoral veins are patent and free of   thrombus bilaterally. The veins are normally compressible and have normal   phasic flow and augmentation response.    Calf veins: The paired peroneal and posterior tibial calf veins are   patent bilaterally.      IMPRESSION:  No deep vein thrombosis seen.    < end of copied text >

## 2018-09-20 NOTE — PROGRESS NOTE ADULT - ATTENDING COMMENTS
34 yo  obese F with congenital aniridia, gestational DM, initially admitted for active labor. Labor was  complicated by pre-eclampsia. She is  s/p  on .   We are consulted for pulmonary hypertension. Her ECHO shows:  Normal left ventricular size and wall thickness.  The left ventricular ejection fraction is estimated to be 60-65% Right atrial size is normal. There is severe tricuspid regurgitation.  The pulmonary artery systolic pressure is estimated to be 58 mmHg. Structurally normal pulmonic valve.   There is mild pulmonic regurgitation. There is no pericardial effusion.    I concur withy the physical exam. Her JVD is not elevated today. She has 2(+) pitting edema. Her MP score is 4. Lungs are clear. Her air entry is better P2 is loud, not split. No murmurs. She is legally blind.    CXR reviewed: Significant improvement noted. from yesterday's film. No pleural effusions     Assessment:  The patient had no symptoms of dyspnea before pregnancy. No PE in herself or anyone in family. No H/O rheumatological conditions diagnosed in her or in the family.   I suspect she is significantly volume overloaded based on her clinical exam and CXR. The elevated PASP is most likely reflective of that.   Suggest:  --diuresis to continue as clinically indicated. May have to diurese intermittently if her BUN/Cr are rising.  --After pt is diuresed will repeat ECHO  --If PASP is elevated in an euvolemic state, will start a full work up. Send serologies (screening) : ELIECER, Rheumatoid factor, CH50  --Pt walked today and did not desaturate or feel dyspneic (without O2)  --Will evaluate pt within 2 weeks of discharge from hospital  --As out-patient will get 6MWT, full PFT's and DLCO as well as sleep test. I do not suspect any structural lung disease and am expecting that her PASP will show a significant improvement when she is euvolemic

## 2018-09-20 NOTE — PROGRESS NOTE ADULT - SUBJECTIVE AND OBJECTIVE BOX
INTERVAL HISTORY:  Patient seen and examined at bedside. Continues to feel well and is able to ambulate more than yesterday - however still experiences orthopnea after lying down for a while.    VITAL SIGNS:  ICU Vital Signs Last 24 Hrs  T(C): 36.6 (20 Sep 2018 15:06), Max: 36.6 (19 Sep 2018 21:30)  T(F): 97.9 (20 Sep 2018 15:06), Max: 97.9 (19 Sep 2018 21:30)  HR: 78 (20 Sep 2018 15:06) (67 - 89)  BP: 142/77 (20 Sep 2018 15:06) (124/76 - 155/79)  BP(mean): --  ABP: --  ABP(mean): --  RR: 18 (20 Sep 2018 15:06) (18 - 20)  SpO2: 99% (20 Sep 2018 15:06) (98% - 99%)        09-19 @ 07:01  -  09-20 @ 07:00  --------------------------------------------------------  IN: 0 mL / OUT: 3350 mL / NET: -3350 mL    09-20 @ 07:01  -  09-20 @ 17:06  --------------------------------------------------------  IN: 0 mL / OUT: 1600 mL / NET: -1600 mL      CAPILLARY BLOOD GLUCOSE      POCT Blood Glucose.: 169 mg/dL (20 Sep 2018 11:48)      PHYSICAL EXAM:  Constitutional: resting comfortably in bed, NAD  HEENT: NC/AT; PERRL, anicteric sclera; no oropharyngeal erythema or exudates; MMM  Neck: supple, no JVD  Respiratory: CTA B/L, no W/R/R; respirations appear non-labored, speaking full sentences  Cardiovascular: +S1/S2, RRR  Gastrointestinal: abdomen soft, NT/ND; +BS x4  Extremities: Persistent bilateral leg edema  Vascular: 2+ radial, DP/PT and femoral pulses B/L      MEDICATIONS:  MEDICATIONS  (STANDING):  diphtheria/tetanus/pertussis (acellular) Vaccine (ADAcel) 0.5 milliLiter(s) IntraMuscular once  heparin  Injectable 5000 Unit(s) SubCutaneous every 12 hours  influenza   Vaccine 0.5 milliLiter(s) IntraMuscular once  insulin lispro (HumaLOG) corrective regimen sliding scale   SubCutaneous Before meals and at bedtime  labetalol 400 milliGRAM(s) Oral every 8 hours  NIFEdipine XL 30 milliGRAM(s) Oral daily    MEDICATIONS  (PRN):  lanolin Ointment 1 Application(s) Topical every 3 hours PRN Sore Nipples  ondansetron Injectable 4 milliGRAM(s) IV Push every 6 hours PRN Nausea  simethicone 80 milliGRAM(s) Chew every 4 hours PRN Gas      ALLERGIES:  Allergies    Allergy Status Unknown    Intolerances        LABS:                        9.2    12.2  )-----------( 355      ( 20 Sep 2018 06:31 )             29.8     09-20    139  |  99  |  11  ----------------------------<  144<H>  4.3   |  27  |  0.72    Ca    8.6      20 Sep 2018 07:03    TPro  5.4<L>  /  Alb  2.6<L>  /  TBili  0.4  /  DBili  x   /  AST  14  /  ALT  9<L>  /  AlkPhos  69  09-20          RADIOLOGY & ADDITIONAL TESTS:   < from: Echocardiogram (09.20.18 @ 11:31) >  The left ventricular wall motion is normal.The   left ventricular ejection fraction is estimated to be 55-60%The left atrial   size is normal.Right atrial size is normal.The right ventricle is normal in size   and function.Structurally normal aortic valve.No aortic regurgitation noted.Structurally normal mitral valve.There is mild mitral   regurgitation.Structurally normal tricuspid valve.There is mild tricuspid regurgitation.There is moderate pulmonary hypertension.The pulmonary   artery systolic pressure is estimated to be 50 mmHg.The inferior vena cava is   normal in size (<2.1 cm) with abnormal inspiratory collapse (<50%) consistent   with mildly elevated right atrial pressure (8mmHg, range 5-10mmHg).  There is no pericardial effusion.    < end of copied text >

## 2018-09-20 NOTE — PROGRESS NOTE ADULT - ASSESSMENT
33y Female POD#4 after C/S for breech of the second twin, with course c/b preeclampsia w/ severe features (BPs), ileus, and pulmonary hypertension   - PEC with severe features: Nephrology following. s/p IV mg. s/p multiple IVP labetalol for severe range BP. Blood pressure better controlled overnight. Currently on Labetalol 400 TID and Nifedipine 30 xl qd. Creatinine trending down.   - Ileus: Abdominal exam significantly improved. Tolerating CLD without n/v. Advance diet to regular.  - Pulmonary HTN with pulmonary edema: Pulmonology following. Patient currently asymptomatic. s/p IV lasix. Additional labs to r/o autoimmune d/o with results pending. Plan to repeat ECHO today for improvement in pulm htn and obtain BLE dopplers to r/o thrombus. Pulm team to discuss if patient to receive more lasix.    -Diabetes: pregestational vs GDMAs - FS 33y Female POD#4 after C/S for breech of the second twin, with course c/b preeclampsia w/ severe features (BPs), ileus, and pulmonary hypertension   - PEC with severe features: Nephrology following. s/p IV mg. s/p multiple IVP labetalol for severe range BP. Blood pressure better controlled overnight. Currently on Labetalol 400 TID and Nifedipine 30 xl qd. Creatinine trending down.   - Ileus: Abdominal exam significantly improved. Tolerating CLD without n/v. Advance diet to regular.  - Pulmonary HTN with pulmonary edema: Pulmonology following. Patient currently asymptomatic. s/p IV lasix. Additional labs to r/o autoimmune d/o with results pending. Plan to repeat ECHO today for improvement in pulm htn and obtain BLE dopplers to r/o thrombus. Pulm team to discuss if patient to receive more lasix.    -Diabetes: pregestational vs GDMAs - continue preprandial FS with moderate sliding scale, patient ordered for diabetic diet  - Dispo: pending stable BPs and improving pulm htn with sign-off from nephrology and pulmonology 33y Female POD#4 after C/S for breech of the second twin, with course c/b preeclampsia w/ severe features (BPs), ileus, and pulmonary hypertension   - PEC with severe features: Nephrology following. s/p IV mg. s/p multiple IVP labetalol for severe range BP. Blood pressure better controlled overnight. Currently on Labetalol 400 TID and Nifedipine 30 xl qd. Creatinine trending down.   - Ileus: Abdominal exam significantly improved. Tolerating CLD without n/v. Advance diet to regular.  - Pulmonary HTN with pulmonary edema: Pulmonology following. Patient currently asymptomatic. s/p IV lasix. Additional labs to r/o autoimmune d/o with results pending. Plan to repeat ECHO today for improvement in pulm htn and obtain BLE dopplers to r/o thrombus. Pulm team to discuss if patient to receive more lasix.    -Diabetes: pregestational vs GDMAs - continue preprandial FS with moderate sliding scale, patient ordered for diabetic diet  - Dispo: pending stable BPs and improving pulm htn with sign-off from nephrology and pulmonology    PGY3 addendum  I evaluated the patient with JENNY Tovar and agree with above note.

## 2018-09-20 NOTE — PROGRESS NOTE ADULT - ASSESSMENT
32yo obese F with unclear PMH, poor historian,  3 elective abortions, h/o PEC in first pregnancy w different father, twin gestation c/b lifestyle controlled GHTN and GDM now req. emergent Csection at 34wks for severe PEC/breach:    # Severe PEC  - pending repeat cmp today however all PEC labs aside from MINDY appear to be resolved and bp is now better controlled with no severe range bp's since yesterday afternoon. -   - Cont 400mg TID Labetalol and 30mg Nifedipine daily. Goal bp <150/100  -  24hr CrCl low from 18th-19, 35, f/u Cr today. Called lab to add on 24hr protein, last spot protein 2.6g. Per pt's knowledge no prior h/o proteinuria during pregnancy.

## 2018-09-20 NOTE — PROGRESS NOTE ADULT - ASSESSMENT
33y Female POD#4 after C/S for breech of the second twin, with course c/b preeclampsia w/ severe features (BPs), ileus, and pulmonary hypertension   - PEC with severe features: BP improved today on PO  Labetalol 400 TID and Nifedipine 30 xl qd. Creatinine trending down. Nephrology following. s/p IV mg. s/p multiple IVP labetalol for severe range BP.   - Ileus: tolerating regular diet, exam improved, passing flatus and having regular BMs  - Pulmonary HTN with pulmonary edema: Pulmonology following. Patient currently asymptomatic. s/p IV lasix. Additional labs to r/o autoimmune d/o with results pending. echo w/ EF 55-60%, PAH 50 mmHg, LE dopplers negative.   -Diabetes: pregestational vs GDMAs - continue preprandial FS with moderate sliding scale, patient ordered for diabetic diet  - Dispo: likely tomorrow

## 2018-09-20 NOTE — PROGRESS NOTE ADULT - ASSESSMENT
34 yo [id number] obese F with congenital aniridia, gestational DM, initially admitted for active labor complicated by pre-eclampsia s/p  on . We are consulted for pulmonary hypertension

## 2018-09-21 ENCOUNTER — TRANSCRIPTION ENCOUNTER (OUTPATIENT)
Age: 33
End: 2018-09-21

## 2018-09-21 VITALS
OXYGEN SATURATION: 99 % | HEART RATE: 84 BPM | SYSTOLIC BLOOD PRESSURE: 151 MMHG | DIASTOLIC BLOOD PRESSURE: 77 MMHG | TEMPERATURE: 98 F | RESPIRATION RATE: 18 BRPM

## 2018-09-21 LAB
ALBUMIN SERPL ELPH-MCNC: 2.4 G/DL — LOW (ref 3.3–5)
ALP SERPL-CCNC: 73 U/L — SIGNIFICANT CHANGE UP (ref 40–120)
ALT FLD-CCNC: 11 U/L — SIGNIFICANT CHANGE UP (ref 10–45)
ANA TITR SER: NEGATIVE — SIGNIFICANT CHANGE UP
ANION GAP SERPL CALC-SCNC: 11 MMOL/L — SIGNIFICANT CHANGE UP (ref 5–17)
APTT BLD: 28.7 SEC — SIGNIFICANT CHANGE UP (ref 27.5–37.4)
AST SERPL-CCNC: 15 U/L — SIGNIFICANT CHANGE UP (ref 10–40)
BILIRUB SERPL-MCNC: 0.4 MG/DL — SIGNIFICANT CHANGE UP (ref 0.2–1.2)
BUN SERPL-MCNC: 14 MG/DL — SIGNIFICANT CHANGE UP (ref 7–23)
CALCIUM SERPL-MCNC: 8.8 MG/DL — SIGNIFICANT CHANGE UP (ref 8.4–10.5)
CARDIOLIPIN AB SER-ACNC: NEGATIVE — SIGNIFICANT CHANGE UP
CHLORIDE SERPL-SCNC: 99 MMOL/L — SIGNIFICANT CHANGE UP (ref 96–108)
CO2 SERPL-SCNC: 27 MMOL/L — SIGNIFICANT CHANGE UP (ref 22–31)
COLLECT DURATION TIME UR: 24 HR — SIGNIFICANT CHANGE UP
COLLECT DURATION TIME UR: 24 HR — SIGNIFICANT CHANGE UP
CREAT SERPL-MCNC: 0.78 MG/DL — SIGNIFICANT CHANGE UP (ref 0.5–1.3)
GLUCOSE BLDC GLUCOMTR-MCNC: 123 MG/DL — HIGH (ref 70–99)
GLUCOSE SERPL-MCNC: 132 MG/DL — HIGH (ref 70–99)
HCT VFR BLD CALC: 30.9 % — LOW (ref 34.5–45)
HGB BLD-MCNC: 9.4 G/DL — LOW (ref 11.5–15.5)
INR BLD: 1.04 — SIGNIFICANT CHANGE UP (ref 0.88–1.16)
MCHC RBC-ENTMCNC: 27.3 PG — SIGNIFICANT CHANGE UP (ref 27–34)
MCHC RBC-ENTMCNC: 30.4 G/DL — LOW (ref 32–36)
MCV RBC AUTO: 89.8 FL — SIGNIFICANT CHANGE UP (ref 80–100)
PLATELET # BLD AUTO: 379 K/UL — SIGNIFICANT CHANGE UP (ref 150–400)
POTASSIUM SERPL-MCNC: 4.4 MMOL/L — SIGNIFICANT CHANGE UP (ref 3.5–5.3)
POTASSIUM SERPL-SCNC: 4.4 MMOL/L — SIGNIFICANT CHANGE UP (ref 3.5–5.3)
PROT 24H UR-MRATE: 2016 MG/24 H — HIGH (ref 50–100)
PROT SERPL-MCNC: 5.9 G/DL — LOW (ref 6–8.3)
PROTHROM AB SERPL-ACNC: 11.5 SEC — SIGNIFICANT CHANGE UP (ref 9.8–12.7)
RBC # BLD: 3.44 M/UL — LOW (ref 3.8–5.2)
RBC # FLD: 14.6 % — SIGNIFICANT CHANGE UP (ref 10.3–16.9)
SODIUM SERPL-SCNC: 137 MMOL/L — SIGNIFICANT CHANGE UP (ref 135–145)
TOTAL HEM COMP BLD-ACNC: >100 U/ML — HIGH (ref 42–95)
TOTAL VOLUME - 24 HOUR: 4200 ML — SIGNIFICANT CHANGE UP
TOTAL VOLUME - 24 HOUR: 4200 ML — SIGNIFICANT CHANGE UP
URINE CREATININE CALCULATION: 0.6 G/24 H — LOW (ref 0.8–1.8)
URINE CREATININE CALCULATION: 0.6 G/24 H — LOW (ref 0.8–1.8)
WBC # BLD: 9.7 K/UL — SIGNIFICANT CHANGE UP (ref 3.8–10.5)
WBC # FLD AUTO: 9.7 K/UL — SIGNIFICANT CHANGE UP (ref 3.8–10.5)

## 2018-09-21 PROCEDURE — 99231 SBSQ HOSP IP/OBS SF/LOW 25: CPT

## 2018-09-21 RX ORDER — NIFEDIPINE 30 MG
1 TABLET, EXTENDED RELEASE 24 HR ORAL
Qty: 30 | Refills: 0 | OUTPATIENT
Start: 2018-09-21 | End: 2018-10-20

## 2018-09-21 RX ORDER — LABETALOL HCL 100 MG
2 TABLET ORAL
Qty: 180 | Refills: 0 | OUTPATIENT
Start: 2018-09-21 | End: 2018-10-20

## 2018-09-21 RX ADMIN — Medication 30 MILLIGRAM(S): at 06:42

## 2018-09-21 RX ADMIN — Medication 400 MILLIGRAM(S): at 02:32

## 2018-09-21 RX ADMIN — Medication 400 MILLIGRAM(S): at 09:48

## 2018-09-21 RX ADMIN — HEPARIN SODIUM 5000 UNIT(S): 5000 INJECTION INTRAVENOUS; SUBCUTANEOUS at 06:42

## 2018-09-21 NOTE — PROGRESS NOTE ADULT - PROVIDER SPECIALTY LIST ADULT
Nephrology
OB
Pulmonology
Pulmonology
OB
Pulmonology

## 2018-09-21 NOTE — DISCHARGE NOTE OB - CARE PLAN
Principal Discharge DX:	Postpartum state  Goal:	postpartum care  Assessment and plan of treatment:	33y female s/p  section, post operative day 5, meeting all postpartum milestones. No heavy lifting. Pelvic rest until cleared. Follow up with obstetrician in 1 week. Contact information provided below.  Secondary Diagnosis:	Preeclampsia  Goal:	continue medication as prescribed and blood pressure monitoring  Assessment and plan of treatment:	Medications: Labetalol 400mg by mouth every 8 hours (2am,10am,6pm). Nifedipine XL 30mg by mouth once daily (7am).  Monitor blood pressure three times daily prior to taking Labetalol. If systolic blood pressure (top number) is less than 110 AND/OR diastolic blood pressure (bottom number) is less than 60, do NOT take Labetalol and repeat blood pressure in 1 hour. If blood pressure continues to remain low, do NOT take Labetalol at that time and continue the medication at the next scheduled dose using the same above protocol. Contact obstetrician if you skip 2 or more doses of Labetalol.   Document blood pressures to review with nephrologist at follow-up appointment. Return to hospital for systolic blood pressure (top number) equal to or greater than 160 AND/OR diastolic blood pressure (bottom number)equal to or greater than 110 OR any of the following symptoms: changes in vision, headache not relieved with Tylenol, severe abdominal pain, vomiting, increased vaginal bleeding, chest pain or shortness of breath. Call obstetrician for persistent systolic blood pressure (top number) equal to or greater than 150 AND/OR diastolic blood pressure (bottom number) equal to or greater than 100.   Follow up with nephrologist (Dr. Sparrow) in 1 week. Contact information provided below.  Secondary Diagnosis:	Pulmonary hypertension  Goal:	be healthy  Assessment and plan of treatment:	Return to emergency department for shortness of breath or chest pain.  Follow up with pulmonologist (Dr. Patel) in 1-2 weeks. Contact information provided below. Principal Discharge DX:	Postpartum state  Goal:	postpartum care  Assessment and plan of treatment:	33y female s/p  section, post operative day 5, meeting all postpartum milestones. No heavy lifting. Pelvic rest until cleared. Follow up with obstetrician in 1 week. Repeat glucose test at follow-up visit. Contact information provided below.  Secondary Diagnosis:	Preeclampsia  Goal:	continue medication as prescribed and blood pressure monitoring  Assessment and plan of treatment:	Medications: Labetalol 400mg by mouth every 8 hours (2am,10am,6pm). Nifedipine XL 30mg by mouth once daily (7am).  Monitor blood pressure three times daily prior to taking Labetalol. If systolic blood pressure (top number) is less than 110 AND/OR diastolic blood pressure (bottom number) is less than 60, do NOT take Labetalol and repeat blood pressure in 1 hour. If blood pressure continues to remain low, do NOT take Labetalol at that time and continue the medication at the next scheduled dose using the same above protocol. Contact obstetrician if you skip 2 or more doses of Labetalol.   Document blood pressures to review with nephrologist at follow-up appointment. Return to hospital for systolic blood pressure (top number) equal to or greater than 160 AND/OR diastolic blood pressure (bottom number)equal to or greater than 110 OR any of the following symptoms: changes in vision, headache not relieved with Tylenol, severe abdominal pain, vomiting, increased vaginal bleeding, chest pain or shortness of breath. Call obstetrician for persistent systolic blood pressure (top number) equal to or greater than 150 AND/OR diastolic blood pressure (bottom number) equal to or greater than 100.   Follow up with nephrologist (Dr. Sparrow) in 1 week. Contact information provided below.  Secondary Diagnosis:	Pulmonary hypertension  Goal:	be healthy  Assessment and plan of treatment:	Return to emergency department for shortness of breath or chest pain.  Follow up with pulmonologist (Dr. Patel) in 1-2 weeks. Contact information provided below.

## 2018-09-21 NOTE — DISCHARGE NOTE OB - PATIENT PORTAL LINK FT
You can access the [facility] Patient Portal, offered by [facility], by registering with the following website: [email or web address]

## 2018-09-21 NOTE — PROGRESS NOTE ADULT - ASSESSMENT
34 yo [id number] obese F with congenital aniridia, gestational DM, initially admitted for active labor complicated by pre-eclampsia s/p  on . We are consulted for pulmonary hypertension

## 2018-09-21 NOTE — PROGRESS NOTE ADULT - ATTENDING COMMENTS
34 yo  obese F with congenital aniridia, gestational DM, initially admitted for active labor. Labor was  complicated by pre-eclampsia. She is  s/p  on .   We are consulted for pulmonary hypertension. Her ECHO during this admission showed:  Normal left ventricular size and wall thickness.  The left ventricular ejection fraction is estimated to be 60-65% Right atrial size is normal. There is severe tricuspid regurgitation.  The pulmonary artery systolic pressure is estimated to be 58 mmHg. Structurally normal pulmonic valve.   There is mild pulmonic regurgitation. There is no pericardial effusion.    I concur withy the physical exam. Her JVD is not elevated today. She is sitting out of bed to chair without O2 and is not dyspneic. Her pitting edema is less. Her MP score is 4. Lungs are clear. Her air entry is better P2 is loud, not split. No murmurs. She is legally blind.    CXR reviewed: Significant improvement noted. from yesterday's film. No pleural effusions     Assessment:  The patient had no symptoms of dyspnea before pregnancy. No PE in herself or anyone in family. No H/O rheumatological conditions diagnosed in her or in the family.   I suspect she is significantly volume overloaded based on her clinical exam and CXR. The elevated PASP is most likely reflective of that.   Suggest:  --diuresis to continue as clinically indicated. May have to diurese intermittently if her BUN/Cr are rising.  --After pt is diuresed will repeat ECHO  --If PASP is elevated in an euvolemic state, will start a full work up. Send serologies (screening) : ELIECER, Rheumatoid factor, CH50  --Pt walked today and did not desaturate or feel dyspneic (without O2)  --Will evaluate pt within 2 weeks of discharge from hospital  --As out-patient will get 6MWT, full PFT's and DLCO as well as sleep test. I do not suspect any structural lung disease and am expecting that her PASP will show a significant improvement when she is euvolemic

## 2018-09-21 NOTE — DISCHARGE NOTE OB - HOSPITAL COURSE
s/p twin delivery via c/s for breech presentation of baby B. Course c/b PEC with severe features, Ileus, and pulmonary hypertension.   - PEC with severe features: s/p IV mg. Nephrology consulted for uncontrolled BPs - patient s/p multiple IVP labetalol for severe range BPs. Currently BP controlled with goal BP <150/100 on  Labetalol 400 TID and Nifedipine 30 xl qd with downtrending creatinine. Patient cleared by nephrology for discharge with close f/u.   - Ileus: resolved, tolerating regular diet with improved abdominal exam.  - Pulmonary HTN with pulmonary edema: Pulmonology following - s/p IV lasix. Repeat ECHO with slight improvement. Dopplers negative for DVT. Patient cleared by pulmonology for discharge with close f/u.    Patient to f/u with obstetrician, nephrologist and pulmonologist upon discharge - all information provided. s/p twin delivery via c/s for breech presentation of baby B. Course c/b PEC with severe features, Ileus, and pulmonary hypertension.   - PEC with severe features: s/p IV mg. Nephrology consulted for uncontrolled BPs - patient s/p multiple IVP labetalol for severe range BPs. Currently BP controlled with goal BP <150/100 on  Labetalol 400 TID and Nifedipine 30 xl qd with downtrending creatinine. Patient cleared by nephrology for discharge with close f/u.   - Ileus: resolved, tolerating regular diet with improved abdominal exam.  - Pulmonary HTN with pulmonary edema: Pulmonology following - s/p IV lasix. Repeat ECHO with slight improvement. Dopplers negative for DVT. Patient cleared by pulmonology for discharge with close f/u.  - Nursing home care services coordinated by case management with start date today 9/21.    Patient to f/u with obstetrician, nephrologist and pulmonologist upon discharge - all information provided.

## 2018-09-21 NOTE — PROGRESS NOTE ADULT - SUBJECTIVE AND OBJECTIVE BOX
Patient evaluated at bedside this morning.  She reports pain is well controlled with Tylenol.  She denies headache, dizziness, chest pain, palpitations, shortness of breath, nausea, vomiting or heavy vaginal bleeding.  She has been ambulating without assistance, voiding spontaneously, passing gas, tolerating regular diet.    Physical Exam:  Vital Signs Last 24 Hrs  T(C): 36.5 (21 Sep 2018 09:18), Max: 36.6 (20 Sep 2018 15:06)  T(F): 97.7 (21 Sep 2018 09:18), Max: 97.9 (20 Sep 2018 15:06)  HR: 84 (21 Sep 2018 09:18) (74 - 88)  BP: 151/77 (21 Sep 2018 09:18) (139/77 - 158/83)  BP(mean): --  RR: 18 (21 Sep 2018 09:18) (16 - 18)  SpO2: 99% (21 Sep 2018 09:18) (99% - 99%)    GA: NAD, A+0 x 3  CV: RRR, clear s1 s2  Pulm: CTAB, no wheezes/rhonchi  Abd: + BS, soft, nontender, nondistended, no rebound or guarding, prevena dressing removed - incision clean, dry and intact, uterus firm at midline at level of umbilicus  : lochia WNL  Extremities: 2+ pitting edema                            9.4    9.7   )-----------( 379      ( 21 Sep 2018 08:19 )             30.9     09-21    137  |  99  |  14  ----------------------------<  132<H>  4.4   |  27  |  0.78    Ca    8.8      21 Sep 2018 08:18    TPro  5.9<L>  /  Alb  2.4<L>  /  TBili  0.4  /  DBili  x   /  AST  15  /  ALT  11  /  AlkPhos  73  09-21      PT/INR - ( 21 Sep 2018 08:19 )   PT: 11.5 sec;   INR: 1.04          PTT - ( 21 Sep 2018 08:19 )  PTT:28.7 sec  diphtheria/tetanus/pertussis (acellular) Vaccine (ADAcel) 0.5 milliLiter(s) IntraMuscular once  heparin  Injectable 5000 Unit(s) SubCutaneous every 12 hours  insulin lispro (HumaLOG) corrective regimen sliding scale   SubCutaneous Before meals and at bedtime  labetalol 400 milliGRAM(s) Oral every 8 hours  lanolin Ointment 1 Application(s) Topical every 3 hours PRN  NIFEdipine XL 30 milliGRAM(s) Oral daily  ondansetron Injectable 4 milliGRAM(s) IV Push every 6 hours PRN  simethicone 80 milliGRAM(s) Chew every 4 hours PRN

## 2018-09-21 NOTE — DISCHARGE NOTE OB - CARE PROVIDERS DIRECT ADDRESSES
,DirectAddress_Unknown,[email or web address]

## 2018-09-21 NOTE — DISCHARGE NOTE OB - MEDICATION SUMMARY - MEDICATIONS TO TAKE
I will START or STAY ON the medications listed below when I get home from the hospital:    labetalol 200 mg oral tablet  -- 2 tab(s) by mouth every 8 hours  -- Indication: For Preeclampsia    NIFEdipine 30 mg oral tablet, extended release  -- 1 tab(s) by mouth once a day  -- Indication: For Preeclampsia

## 2018-09-21 NOTE — PROGRESS NOTE ADULT - SUBJECTIVE AND OBJECTIVE BOX
INTERVAL HISTORY:  Patient seen and examined at bedside. Feels well and is able to ambulate without any restriction. She is able to lie down without feeling short of breath as well. She also reports that she is now able to cross her legs and put on her socks, which she was unable to before.     VITAL SIGNS:  ICU Vital Signs Last 24 Hrs  T(C): 36.4 (20 Sep 2018 21:11), Max: 36.6 (20 Sep 2018 15:06)  T(F): 97.5 (20 Sep 2018 21:11), Max: 97.9 (20 Sep 2018 15:06)  HR: 81 (21 Sep 2018 06:32) (74 - 88)  BP: 155/92 (21 Sep 2018 06:32) (139/77 - 158/83)  BP(mean): --  ABP: --  ABP(mean): --  RR: 16 (21 Sep 2018 04:40) (16 - 18)  SpO2: 99% (21 Sep 2018 04:40) (98% - 99%)        09-20 @ 07:01  -  09-21 @ 07:00  --------------------------------------------------------  IN: 0 mL / OUT: 3400 mL / NET: -3400 mL      CAPILLARY BLOOD GLUCOSE      POCT Blood Glucose.: 123 mg/dL (21 Sep 2018 06:40)      PHYSICAL EXAM:  Constitutional: resting comfortably in bed, NAD  HEENT: NC/AT; PERRL, anicteric sclera; no oropharyngeal erythema or exudates; MMM  Neck: supple, no JVD, less accentuation of JVD with HJR  Respiratory: CTA B/L, no W/R/R; respirations appear non-labored, speaking full sentences  Cardiovascular: +S1/S2, RRR  Gastrointestinal: abdomen soft, NT/ND; +BS x4  Extremities: WWP, persistent bilateral 2+ pitting LE edema.  Vascular: 2+ radial, DP/PT and femoral pulses B/L      MEDICATIONS:  MEDICATIONS  (STANDING):  diphtheria/tetanus/pertussis (acellular) Vaccine (ADAcel) 0.5 milliLiter(s) IntraMuscular once  heparin  Injectable 5000 Unit(s) SubCutaneous every 12 hours  insulin lispro (HumaLOG) corrective regimen sliding scale   SubCutaneous Before meals and at bedtime  labetalol 400 milliGRAM(s) Oral every 8 hours  NIFEdipine XL 30 milliGRAM(s) Oral daily    MEDICATIONS  (PRN):  lanolin Ointment 1 Application(s) Topical every 3 hours PRN Sore Nipples  ondansetron Injectable 4 milliGRAM(s) IV Push every 6 hours PRN Nausea  simethicone 80 milliGRAM(s) Chew every 4 hours PRN Gas      ALLERGIES:  Allergies    Allergy Status Unknown    Intolerances        LABS:                        9.4    9.7   )-----------( 379      ( 21 Sep 2018 08:19 )             30.9     09-20    139  |  99  |  11  ----------------------------<  144<H>  4.3   |  27  |  0.72    Ca    8.6      20 Sep 2018 07:03    TPro  5.4<L>  /  Alb  2.6<L>  /  TBili  0.4  /  DBili  x   /  AST  14  /  ALT  9<L>  /  AlkPhos  69  09-20    PT/INR - ( 21 Sep 2018 08:19 )   PT: 11.5 sec;   INR: 1.04          PTT - ( 21 Sep 2018 08:19 )  PTT:28.7 sec      RADIOLOGY & ADDITIONAL TESTS:   No new radiology

## 2018-09-21 NOTE — PROGRESS NOTE ADULT - PROBLEM SELECTOR PLAN 1
Symptomatically patient continues to improve and she did not desaturated with the 6MWT, and was able to walk throughout the test without interruption. As mentioned yesterday her pulmonary pressures have improved. LE dopplers negative for DVT. B2 microglobulin very mildly elevated.  Again, currently no obvious clues to favor cardiac, primary pulmonary, connective tissue disease, thromboembolic, or other causes.   - Awaiting rest of serology for autoimmune disease  - No need for home O2  - Once discharged, can follow up with us on outpatient setting in 2 weeks

## 2018-09-21 NOTE — PROGRESS NOTE ADULT - REASON FOR ADMISSION
delivery
pec
preeclampsia
Severe PEC, ileus
pec
Pre-eclampsia

## 2018-09-21 NOTE — DISCHARGE NOTE OB - PROVIDER TOKENS
FREE:[LAST:[Jorge L],FIRST:[Manoj],PHONE:[phone]],FAX:[(   [phone]    -],ADDRESS:[[address]]],TOKEN:'[id number]',TOKEN:'[id number]'

## 2018-09-21 NOTE — DISCHARGE NOTE OB - PLAN OF CARE
postpartum care 33y female s/p  section, post operative day 5, meeting all postpartum milestones. No heavy lifting. Pelvic rest until cleared. Follow up with obstetrician in 1 week. Contact information provided below. continue medication as prescribed and blood pressure monitoring Medications: Labetalol 400mg by mouth every 8 hours (2am,10am,6pm). Nifedipine XL 30mg by mouth once daily (7am).  Monitor blood pressure three times daily prior to taking Labetalol. If systolic blood pressure (top number) is less than 110 AND/OR diastolic blood pressure (bottom number) is less than 60, do NOT take Labetalol and repeat blood pressure in 1 hour. If blood pressure continues to remain low, do NOT take Labetalol at that time and continue the medication at the next scheduled dose using the same above protocol. Contact obstetrician if you skip 2 or more doses of Labetalol.   Document blood pressures to review with nephrologist at follow-up appointment. Return to hospital for systolic blood pressure (top number) equal to or greater than 160 AND/OR diastolic blood pressure (bottom number)equal to or greater than 110 OR any of the following symptoms: changes in vision, headache not relieved with Tylenol, severe abdominal pain, vomiting, increased vaginal bleeding, chest pain or shortness of breath. Call obstetrician for persistent systolic blood pressure (top number) equal to or greater than 150 AND/OR diastolic blood pressure (bottom number) equal to or greater than 100.   Follow up with nephrologist (Dr. Sparrow) in 1 week. Contact information provided below. be healthy Return to emergency department for shortness of breath or chest pain.  Follow up with pulmonologist (Dr. Patel) in 1-2 weeks. Contact information provided below. 33y female s/p  section, post operative day 5, meeting all postpartum milestones. No heavy lifting. Pelvic rest until cleared. Follow up with obstetrician in 1 week. Repeat glucose test at follow-up visit. Contact information provided below.

## 2018-09-21 NOTE — DISCHARGE NOTE OB - CARE PROVIDER_API CALL
Manoj Coats    [address]  Phone: [phone]  Fax: [phone]   )    -    Elijah Patel), Critical Care Medicine; Internal Medicine; Pulmonary Disease  [address] [place] [address]  Phone: [phone]  Fax: [phone]    Ami Sparrow), Nephrology  [address]  [address] [place] [address]  Phone: [phone]  Fax: [phone]

## 2018-09-21 NOTE — PROGRESS NOTE ADULT - ASSESSMENT
s/p twin delivery via c/s for breech presentation of baby B. POD5. Course c/b PEC with severe features, Ileus, and pulmonary hypertension.   - PEC with severe features: s/p IV mg. Nephrology consulted for uncontrolled BPs - patient s/p multiple IVP labetalol for severe range BPs. Currently BP controlled with goal BP <150/100 on  Labetalol 400 TID and Nifedipine 30 xl qd with downtrending creatinine. Patient cleared by nephrology for discharge with close f/u.   - Ileus: resolved, tolerating regular diet with improved abdominal exam.  - Pulmonary HTN with pulmonary edema: Pulmonology following - s/p IV lasix. Repeat ECHO with slight improvement. Dopplers negative for DVT. Patient cleared by pulmonology for discharge with close f/u.  - disposition: nursing home care services coordinated for home today. discharge instructions discussed. s/p twin delivery via c/s for breech presentation of baby B. POD5. Course c/b PEC with severe features, Ileus, and pulmonary hypertension.   - PEC with severe features: s/p IV mg. Nephrology consulted for uncontrolled BPs - patient s/p multiple IVP labetalol for severe range BPs. Currently BP controlled with goal BP <150/100 on  Labetalol 400 TID and Nifedipine 30 xl qd with downtrending creatinine. Blood pressures discussed with nephrology and patient cleared by nephrology for discharge with close f/u.   - Ileus: resolved, tolerating regular diet with improved abdominal exam.  - Pulmonary HTN with pulmonary edema: Pulmonology following - s/p IV lasix. Repeat ECHO with slight improvement. Dopplers negative for DVT. Patient cleared by pulmonology for discharge with close f/u.  - disposition: nursing home care services coordinated for home today. discharge instructions discussed.

## 2018-09-24 ENCOUNTER — INBOUND DOCUMENT (OUTPATIENT)
Age: 33
End: 2018-09-24

## 2018-10-05 ENCOUNTER — APPOINTMENT (OUTPATIENT)
Dept: NEPHROLOGY | Facility: CLINIC | Age: 33
End: 2018-10-05
Payer: MEDICAID

## 2018-10-05 VITALS — HEART RATE: 64 BPM | RESPIRATION RATE: 14 BRPM | DIASTOLIC BLOOD PRESSURE: 80 MMHG | SYSTOLIC BLOOD PRESSURE: 144 MMHG

## 2018-10-05 DIAGNOSIS — O24.410 GESTATIONAL DIABETES MELLITUS IN PREGNANCY, DIET CONTROLLED: ICD-10-CM

## 2018-10-05 DIAGNOSIS — O14.10 SEVERE PRE-ECLAMPSIA, UNSPECIFIED TRIMESTER: ICD-10-CM

## 2018-10-05 DIAGNOSIS — N17.9 ACUTE KIDNEY FAILURE, UNSPECIFIED: ICD-10-CM

## 2018-10-05 PROCEDURE — 99215 OFFICE O/P EST HI 40 MIN: CPT

## 2018-10-05 RX ORDER — BLOOD-GLUCOSE METER
EACH MISCELLANEOUS
Qty: 1 | Refills: 0 | Status: ACTIVE | COMMUNITY
Start: 2018-09-21

## 2018-10-05 RX ORDER — LABETALOL HYDROCHLORIDE 200 MG/1
200 TABLET, FILM COATED ORAL
Qty: 180 | Refills: 0 | Status: ACTIVE | COMMUNITY
Start: 2018-09-21

## 2018-10-05 RX ORDER — NIFEDIPINE 30 MG/1
30 TABLET, FILM COATED, EXTENDED RELEASE ORAL
Qty: 30 | Refills: 0 | Status: ACTIVE | COMMUNITY
Start: 2018-09-21

## 2018-10-06 PROBLEM — N17.9 AKI (ACUTE KIDNEY INJURY): Status: ACTIVE | Noted: 2018-10-06

## 2018-10-06 PROBLEM — O24.410 DM, GESTATIONAL, DIET CONTROLLED: Status: ACTIVE | Noted: 2018-10-06

## 2018-10-06 PROBLEM — O14.10 PREECLAMPSIA, SEVERE: Status: ACTIVE | Noted: 2018-10-06

## 2018-10-16 PROBLEM — I27.20 PULMONARY HYPERTENSION: Status: ACTIVE | Noted: 2018-10-06

## 2018-10-17 ENCOUNTER — APPOINTMENT (OUTPATIENT)
Dept: PULMONOLOGY | Facility: CLINIC | Age: 33
End: 2018-10-17

## 2018-10-17 DIAGNOSIS — I27.20 PULMONARY HYPERTENSION, UNSPECIFIED: ICD-10-CM

## 2018-11-01 NOTE — PROGRESS NOTE ADULT - SUBJECTIVE AND OBJECTIVE BOX
Patient evaluated at bedside.   She reports pain is well controlled with IV medication  She has been ambulating with assistance, has urine cathter and has not passed gas, on NPO,.    She denies HA, dizziness, CP, palpitations, SOB, n/v, or heavy vaginal bleeding.    Physical Exam:  Vital Signs Last 24 Hrs  T(C): 37.2 (19 Sep 2018 05:00), Max: 37.6 (18 Sep 2018 20:46)  T(F): 99 (19 Sep 2018 05:00), Max: 99.7 (18 Sep 2018 20:46)  HR: 70 (19 Sep 2018 07:00) (69 - 86)  BP: 169/93 (19 Sep 2018 07:00) (146/85 - 172/100)  RR: 20 (19 Sep 2018 07:00) (16 - 20)  SpO2: 97% (19 Sep 2018 01:00) (93% - 100%)    Gen: NAD  Abd: + BS, soft, nontender, nondistended, no rebound or guarding  Incision clean, dry and intact  uterus firm at midline  : lochia WNL  Extremities: no swelling or calf tenderness                          9.8    14.1  )-----------( 296      ( 19 Sep 2018 05:50 )             32.0     09-19    140  |  103  |  12  ----------------------------<  113<H>  4.2   |  26  |  0.85    Ca    8.5      19 Sep 2018 05:50    TPro  5.9<L>  /  Alb  2.4<L>  /  TBili  0.4  /  DBili  x   /  AST  15  /  ALT  8<L>  /  AlkPhos  78  09-19      PT/INR - ( 18 Sep 2018 07:48 )   PT: 10.8 sec;   INR: 0.97          PTT - ( 18 Sep 2018 07:48 )  PTT:27.6 sec      MEDICATIONS  (STANDING):  diphtheria/tetanus/pertussis (acellular) Vaccine (ADAcel) 0.5 milliLiter(s) IntraMuscular once  heparin  Injectable 5000 Unit(s) SubCutaneous every 12 hours  influenza   Vaccine 0.5 milliLiter(s) IntraMuscular once  insulin lispro (HumaLOG) corrective regimen sliding scale   SubCutaneous Before meals and at bedtime  labetalol Injectable 40 milliGRAM(s) IV Push once  labetalol Injectable 40 milliGRAM(s) IV Push every 4 hours  sodium chloride 0.9%. 1000 milliLiter(s) (50 mL/Hr) IV Continuous <Continuous>    MEDICATIONS  (PRN):  lanolin Ointment 1 Application(s) Topical every 3 hours PRN Sore Nipples  ondansetron Injectable 4 milliGRAM(s) IV Push every 6 hours PRN Nausea  simethicone 80 milliGRAM(s) Chew every 4 hours PRN Gas Patient evaluated at bedside.   She reports pain is well controlled with IV medication  She has been ambulating with assistance, has urine cathter and has not passed gas, on NPO,.    She denies HA, dizziness, CP, palpitations, SOB, n/v, or heavy vaginal bleeding.    Physical Exam:  Vital Signs Last 24 Hrs  T(C): 37.2 (19 Sep 2018 05:00), Max: 37.6 (18 Sep 2018 20:46)  T(F): 99 (19 Sep 2018 05:00), Max: 99.7 (18 Sep 2018 20:46)  HR: 70 (19 Sep 2018 07:00) (69 - 86)  BP: 169/93 (19 Sep 2018 07:00) (146/85 - 172/100)  RR: 20 (19 Sep 2018 07:00) (16 - 20)  SpO2: 97% (19 Sep 2018 01:00) (93% - 100%)    Gen: NAD  Abd: + BS, soft, nontender, nondistended, no rebound or guarding  Incision clean, dry and intact  uterus firm at midline  : lochia WNL  Extremities: considerable swelling with mild tenderness                          9.8    14.1  )-----------( 296      ( 19 Sep 2018 05:50 )             32.0     09-19    140  |  103  |  12  ----------------------------<  113<H>  4.2   |  26  |  0.85    Ca    8.5      19 Sep 2018 05:50    TPro  5.9<L>  /  Alb  2.4<L>  /  TBili  0.4  /  DBili  x   /  AST  15  /  ALT  8<L>  /  AlkPhos  78  09-19      PT/INR - ( 18 Sep 2018 07:48 )   PT: 10.8 sec;   INR: 0.97          PTT - ( 18 Sep 2018 07:48 )  PTT:27.6 sec      MEDICATIONS  (STANDING):  diphtheria/tetanus/pertussis (acellular) Vaccine (ADAcel) 0.5 milliLiter(s) IntraMuscular once  heparin  Injectable 5000 Unit(s) SubCutaneous every 12 hours  influenza   Vaccine 0.5 milliLiter(s) IntraMuscular once  insulin lispro (HumaLOG) corrective regimen sliding scale   SubCutaneous Before meals and at bedtime  labetalol Injectable 40 milliGRAM(s) IV Push once  labetalol Injectable 40 milliGRAM(s) IV Push every 4 hours  sodium chloride 0.9%. 1000 milliLiter(s) (50 mL/Hr) IV Continuous <Continuous>    MEDICATIONS  (PRN):  lanolin Ointment 1 Application(s) Topical every 3 hours PRN Sore Nipples  ondansetron Injectable 4 milliGRAM(s) IV Push every 6 hours PRN Nausea  simethicone 80 milliGRAM(s) Chew every 4 hours PRN Gas need for outpatient follow-up/radiology results/lab results

## 2018-11-05 ENCOUNTER — APPOINTMENT (OUTPATIENT)
Dept: NEPHROLOGY | Facility: CLINIC | Age: 33
End: 2018-11-05

## 2018-12-14 ENCOUNTER — APPOINTMENT (OUTPATIENT)
Dept: OPHTHALMOLOGY | Facility: CLINIC | Age: 33
End: 2018-12-14

## 2018-12-26 PROCEDURE — 86038 ANTINUCLEAR ANTIBODIES: CPT

## 2018-12-26 PROCEDURE — 82570 ASSAY OF URINE CREATININE: CPT

## 2018-12-26 PROCEDURE — 71045 X-RAY EXAM CHEST 1 VIEW: CPT

## 2018-12-26 PROCEDURE — 86780 TREPONEMA PALLIDUM: CPT

## 2018-12-26 PROCEDURE — 83735 ASSAY OF MAGNESIUM: CPT

## 2018-12-26 PROCEDURE — 71046 X-RAY EXAM CHEST 2 VIEWS: CPT

## 2018-12-26 PROCEDURE — 86850 RBC ANTIBODY SCREEN: CPT

## 2018-12-26 PROCEDURE — 81001 URINALYSIS AUTO W/SCOPE: CPT

## 2018-12-26 PROCEDURE — 85384 FIBRINOGEN ACTIVITY: CPT

## 2018-12-26 PROCEDURE — 85730 THROMBOPLASTIN TIME PARTIAL: CPT

## 2018-12-26 PROCEDURE — 85025 COMPLETE CBC W/AUTO DIFF WBC: CPT

## 2018-12-26 PROCEDURE — 86900 BLOOD TYPING SEROLOGIC ABO: CPT

## 2018-12-26 PROCEDURE — 82232 ASSAY OF BETA-2 PROTEIN: CPT

## 2018-12-26 PROCEDURE — 93306 TTE W/DOPPLER COMPLETE: CPT

## 2018-12-26 PROCEDURE — 86901 BLOOD TYPING SEROLOGIC RH(D): CPT

## 2018-12-26 PROCEDURE — 86431 RHEUMATOID FACTOR QUANT: CPT

## 2018-12-26 PROCEDURE — 85027 COMPLETE CBC AUTOMATED: CPT

## 2018-12-26 PROCEDURE — 83615 LACTATE (LD) (LDH) ENZYME: CPT

## 2018-12-26 PROCEDURE — 82962 GLUCOSE BLOOD TEST: CPT

## 2018-12-26 PROCEDURE — 84156 ASSAY OF PROTEIN URINE: CPT

## 2018-12-26 PROCEDURE — 88307 TISSUE EXAM BY PATHOLOGIST: CPT

## 2018-12-26 PROCEDURE — 93970 EXTREMITY STUDY: CPT

## 2018-12-26 PROCEDURE — C1889: CPT

## 2018-12-26 PROCEDURE — 86162 COMPLEMENT TOTAL (CH50): CPT

## 2018-12-26 PROCEDURE — 74019 RADEX ABDOMEN 2 VIEWS: CPT

## 2018-12-26 PROCEDURE — 36415 COLL VENOUS BLD VENIPUNCTURE: CPT

## 2018-12-26 PROCEDURE — 99283 EMERGENCY DEPT VISIT LOW MDM: CPT

## 2018-12-26 PROCEDURE — 85610 PROTHROMBIN TIME: CPT

## 2018-12-26 PROCEDURE — 80053 COMPREHEN METABOLIC PANEL: CPT

## 2018-12-26 PROCEDURE — 84550 ASSAY OF BLOOD/URIC ACID: CPT

## 2018-12-26 PROCEDURE — 82575 CREATININE CLEARANCE TEST: CPT

## 2019-08-11 ENCOUNTER — TRANSCRIPTION ENCOUNTER (OUTPATIENT)
Age: 34
End: 2019-08-11

## 2019-10-21 ENCOUNTER — APPOINTMENT (OUTPATIENT)
Dept: OPHTHALMOLOGY | Facility: CLINIC | Age: 34
End: 2019-10-21
Payer: MEDICAID

## 2019-10-21 ENCOUNTER — NON-APPOINTMENT (OUTPATIENT)
Age: 34
End: 2019-10-21

## 2019-10-21 PROCEDURE — 92012 INTRM OPH EXAM EST PATIENT: CPT

## 2019-12-02 ENCOUNTER — APPOINTMENT (OUTPATIENT)
Dept: OPHTHALMOLOGY | Facility: CLINIC | Age: 34
End: 2019-12-02

## 2019-12-27 NOTE — DISCHARGE NOTE OB - NS OB DC MMR REASON NOT RECEIVED
Your bilirubin was mildly elevated.  The CT scan showed the pseudocyst, as well as the portal vein thrombosis, similar to prior imaging.  No new findings.   You white count was elevated and your lactate (sign of inflammation) was mildly elevated.  You were given IV fluids, anti nausea medications, anti-acids.     Given that there are no new findings, that you are feeling improved, but her pain is under control, at this time it is safe for you to follow up as an outpatient.  He develop worsening abdominal pain, repeat or new vomiting, fevers, significant blood in her stools or vomit, black stools, or any other new, worsening or some symptoms please return to emergency department for re-evaluation.    Otherwise follow up with her GI doctor and/or primary care doctor within 1 week for continued evaluation of your symptoms.  
Refused

## 2020-03-02 ENCOUNTER — NON-APPOINTMENT (OUTPATIENT)
Age: 35
End: 2020-03-02

## 2020-03-02 ENCOUNTER — APPOINTMENT (OUTPATIENT)
Age: 35
End: 2020-03-02
Payer: MEDICAID

## 2020-03-02 PROCEDURE — 92012 INTRM OPH EXAM EST PATIENT: CPT

## 2020-06-15 ENCOUNTER — APPOINTMENT (OUTPATIENT)
Age: 35
End: 2020-06-15

## 2020-10-07 NOTE — PATIENT PROFILE OB - PRO PRENATAL LABS ORI SOURCE RUBELLA
I have reviewed discharge instructions with the patient. The patient verbalized understanding. Patient left ED via Discharge Method: ambulatory to Home with family. Opportunity for questions and clarification provided. Patient given 1 scripts. To continue your aftercare when you leave the hospital, you may receive an automated call from our care team to check in on how you are doing. This is a free service and part of our promise to provide the best care and service to meet your aftercare needs.  If you have questions, or wish to unsubscribe from this service please call [phone]. Thank you for Choosing our [facility] Emergency Department.
hard copy, drawn during this pregnancy

## 2024-01-04 NOTE — DISCHARGE NOTE OB - VISION (WITH CORRECTIVE LENSES IF THE PATIENT USUALLY WEARS THEM):
Returned pt's call. She states that her SOB has not improved since seeing AK on 12/20/23 and the doxy and prednisone did not help. She denies other s/s. Pt does not take BP or HR at home. Pt says her PCP advised her to contact us.     To SC, any recommendations? AK suggested a possible CELESTE/cath if SOB did not improve.   Normal vision: sees adequately in most situations; can see medication labels, newsprint

## 2024-04-02 NOTE — LACTATION INITIAL EVALUATION - WEEKS GESTATION
30 mins-Chart and previous hospitalizations reviewed as well as labs, vitals prior to patient being seen for approximately   10 mins-Meds reviewed and prior outpatient workup   20 mins-Patient seen and examined and plan discussed, all questions were answered to the best of my ability  30 mins-Charting/documentation and orders. 34

## 2024-06-18 ENCOUNTER — NON-APPOINTMENT (OUTPATIENT)
Age: 39
End: 2024-06-18

## 2024-07-15 ENCOUNTER — APPOINTMENT (OUTPATIENT)
Dept: OPHTHALMOLOGY | Facility: CLINIC | Age: 39
End: 2024-07-15

## 2024-08-01 ENCOUNTER — APPOINTMENT (OUTPATIENT)
Dept: OPHTHALMOLOGY | Facility: CLINIC | Age: 39
End: 2024-08-01
Payer: MEDICAID

## 2024-08-01 ENCOUNTER — NON-APPOINTMENT (OUTPATIENT)
Age: 39
End: 2024-08-01

## 2024-08-01 PROCEDURE — 92002 INTRM OPH EXAM NEW PATIENT: CPT

## 2024-08-01 PROCEDURE — 76512 OPH US DX B-SCAN: CPT | Mod: RT

## 2024-08-22 ENCOUNTER — NON-APPOINTMENT (OUTPATIENT)
Age: 39
End: 2024-08-22

## 2024-08-22 ENCOUNTER — APPOINTMENT (OUTPATIENT)
Dept: OPHTHALMOLOGY | Facility: CLINIC | Age: 39
End: 2024-08-22
Payer: MEDICAID

## 2024-08-22 PROCEDURE — 92012 INTRM OPH EXAM EST PATIENT: CPT

## 2024-09-20 ENCOUNTER — APPOINTMENT (OUTPATIENT)
Dept: OPHTHALMOLOGY | Facility: CLINIC | Age: 39
End: 2024-09-20

## 2024-09-27 ENCOUNTER — APPOINTMENT (OUTPATIENT)
Dept: OPHTHALMOLOGY | Facility: CLINIC | Age: 39
End: 2024-09-27

## 2025-01-06 ENCOUNTER — APPOINTMENT (OUTPATIENT)
Dept: OPHTHALMOLOGY | Facility: CLINIC | Age: 40
End: 2025-01-06

## 2025-01-09 NOTE — ED ADULT TRIAGE NOTE - CADM TRG TX PRIOR TO ARRIVAL
Problem: Adult Inpatient Plan of Care  Goal: Plan of Care Review  Outcome: Progressing  Goal: Patient-Specific Goal (Individualized)  Outcome: Progressing  Goal: Absence of Hospital-Acquired Illness or Injury  Outcome: Progressing  Goal: Optimal Comfort and Wellbeing  Outcome: Progressing  Goal: Readiness for Transition of Care  Outcome: Progressing     Problem: Infection  Goal: Absence of Infection Signs and Symptoms  Outcome: Progressing     Problem: Wound  Goal: Optimal Coping  Outcome: Progressing  Goal: Optimal Functional Ability  Outcome: Progressing  Goal: Absence of Infection Signs and Symptoms  Outcome: Progressing  Goal: Improved Oral Intake  Outcome: Progressing  Goal: Optimal Pain Control and Function  Outcome: Progressing  Goal: Skin Health and Integrity  Outcome: Progressing  Goal: Optimal Wound Healing  Outcome: Progressing     Problem: Skin Injury Risk Increased  Goal: Skin Health and Integrity  Outcome: Progressing     Problem: Fall Injury Risk  Goal: Absence of Fall and Fall-Related Injury  Outcome: Progressing      none